# Patient Record
Sex: MALE | Race: WHITE | URBAN - METROPOLITAN AREA
[De-identification: names, ages, dates, MRNs, and addresses within clinical notes are randomized per-mention and may not be internally consistent; named-entity substitution may affect disease eponyms.]

---

## 2021-08-06 ENCOUNTER — OFFICE VISIT (OUTPATIENT)
Dept: URGENT CARE | Age: 36
End: 2021-08-06
Payer: COMMERCIAL

## 2021-08-06 VITALS
RESPIRATION RATE: 15 BRPM | BODY MASS INDEX: 36.65 KG/M2 | SYSTOLIC BLOOD PRESSURE: 150 MMHG | TEMPERATURE: 98.7 F | OXYGEN SATURATION: 97 % | HEIGHT: 71 IN | WEIGHT: 261.8 LBS | HEART RATE: 80 BPM | DIASTOLIC BLOOD PRESSURE: 91 MMHG

## 2021-08-06 DIAGNOSIS — M54.9 SUBACUTE BACK PAIN: Primary | ICD-10-CM

## 2021-08-06 PROCEDURE — 99203 OFFICE O/P NEW LOW 30 MIN: CPT | Performed by: FAMILY MEDICINE

## 2021-08-06 RX ORDER — PREDNISONE 10 MG/1
TABLET ORAL
Qty: 21 TABLET | Refills: 0 | Status: SHIPPED | OUTPATIENT
Start: 2021-08-06 | End: 2021-11-12 | Stop reason: ALTCHOICE

## 2021-08-06 NOTE — LETTER
August 6, 2021  [address] [place] [id number]    Dear Mendel Treadwell:  Thank you for requesting access to [facility]. Please follow the instructions below to securely access and download your online medical record. [facility] allows you to send messages to your doctor, view your test results, renew your prescriptions, schedule appointments, and more. How Do I Sign Up? 1. In your internet browser, go to [email or web address]. 2. Click on the First Time User? Click Here link in the Sign In box. You will see the New Member Sign Up page. 3. Enter your [facility] Access Code exactly as it appears below. You will not need to use this code after youve completed the sign-up process. If you do not sign up before the expiration date, you must request a new code. [facility] Access Code: Activation code not generated  Current [facility] Status: Active     4. Enter the last four digits of your Social Security Number (xxxx) and Date of Birth (mm/dd/yyyy) as indicated and click Submit. You will be taken to the next sign-up page. 5. Create a [facility] ID. This will be your [facility] login ID and cannot be changed, so think of one that is secure and easy to remember. 6. Create a [facility] password. You can change your password at any time. 7. Enter your Password Reset Question and Answer. This can be used at a later time if you forget your password. 8. Enter your e-mail address. You will receive e-mail notification when new information is available in [address]. 9. Click Sign Up. You can now view and download portions of your medical record. 10. Click the Download Summary menu link to download a portable copy of your medical information. Additional Information    If you have questions, please visit the Frequently Asked Questions section of the [employer] website at [email or web address] Remember, [facility] is NOT to be used for urgent needs.  For medical emergencies, dial 911.    Now available from your iPhone and Android!     Sincerely,   [employer]

## 2021-08-06 NOTE — PROGRESS NOTES
Here for low back pain  Onset approx 6-8 week ago without acute injury. Pain 4/10 intermittent. Location; midline to right low back. Worse with movement after rest.  Improved with rest.  Has performed 6 weeks of stretching, working out and has intentionally lost weight as he attributed weight gain to be a potential cause of his back pain. Overall not improving    Specifically denies: fever, chills, night sweats, IVDU, pelvic numbness, loss of bowel or bladder control, lower extremity weakness/numbness/tingling             History reviewed. No pertinent past medical history. History reviewed. No pertinent surgical history. History reviewed. No pertinent family history. Social History     Socioeconomic History    Marital status:      Spouse name: Not on file    Number of children: Not on file    Years of education: Not on file    Highest education level: Not on file   Occupational History    Not on file   Tobacco Use    Smoking status: Never Smoker    Smokeless tobacco: Never Used   Substance and Sexual Activity    Alcohol use: Not Currently    Drug use: Not on file    Sexual activity: Not on file   Other Topics Concern    Not on file   Social History Narrative    Not on file     Social Determinants of Health     Financial Resource Strain:     Difficulty of Paying Living Expenses:    Food Insecurity:     Worried About Running Out of Food in the Last Year:     [address] in the Last Year:    Transportation Needs:     Lack of Transportation (Medical):      Lack of Transportation (Non-Medical):    Physical Activity:     Days of Exercise per Week:     Minutes of Exercise per Session:    Stress:     Feeling of Stress :    Social Connections:     Frequency of Communication with Friends and Family:     Frequency of Social Gatherings with Friends and Family:     Attends Jehovah's witness Services:     Active Member of Clubs or Organizations:     Attends Club or Organization Meetings:     Marital Status:    Intimate Partner Violence:     Fear of Current or Ex-Partner:     Emotionally Abused:     Physically Abused:     Sexually Abused: ALLERGIES: Patient has no known allergies. Review of Systems   All other systems reviewed and are negative. Vitals:    08/06/21 1350   BP: (!) 150/91   Pulse: 80   Resp: 15   Temp: 98.7 °F (37.1 °C)   SpO2: 97%   Weight: 261 lb 12.8 oz (118.8 kg)   Height: 5' 11\" (1.803 m)       Physical Exam  Vitals reviewed. Constitutional:       General: He is not in acute distress. Appearance: He is not diaphoretic. HENT:      Head: Normocephalic and atraumatic. Eyes:      Extraocular Movements: Extraocular movements intact. Cardiovascular:      Rate and Rhythm: Normal rate and regular rhythm. Pulses: Normal pulses. Musculoskeletal:        Back:       Comments:   Cap refill upper and lower extremities < 2 sec with normal neurovascular sensation to light touch bilaterally. Heel and toe walk: Intact  Plantar and dorsiflexion ankle full 5/5 strength  Torso rotation: full AROM without pain  Toe touch: full AROM minimal pain  SLR: negative bilat  Nic:negative bilat   Skin:     Capillary Refill: Capillary refill takes less than 2 seconds. Neurological:      Mental Status: He is alert and oriented to person, place, and time. Psychiatric:         Mood and Affect: Mood normal.         Behavior: Behavior normal.         Thought Content:  Thought content normal.         MDM     Differential Diagnosis; Clinical Impression; Plan:       CLINICAL IMPRESSION:  (M54.9) Subacute back pain  (primary encounter diagnosis)    Orders Placed This Encounter      predniSONE (STERAPRED DS) 10 mg dose pack          Sig: Take per dose pack instructions          Dispense:  21 Tablet          Refill:  0      Plan:  Prednisone for symptomatic relief  advised formal PT evaluation/treatment  To follow up with PCP or ortho if not improved in 4 weeks  Immediate follow up for new, worsening or changes  No abnormalities noted on plain film per radiology read      We have reviewed concerning signs/symptoms, normal vs abnormal progression of medical condition and when to seek immediate medical attention. Procedures      XR Results (most recent):  Results from Appointment encounter on 08/06/21    XR SPINE LUMB 2 OR 3 V    Narrative  EXAM:  XR SPINE LUMB 2 OR 3 V    INDICATION:   low back pain for 4-6 weeks. no acute injury. midline to left  lunbar with intermittent left sciatica    COMPARISON: None. FINDINGS: AP, lateral and spot lateral views of the lumbar spine demonstrate  normal alignment. The vertebral body heights and disc spaces are  well-preserved. There is no fracture, subluxation or other abnormality. Impression  No acute abnormality is identified.

## 2021-11-12 ENCOUNTER — OFFICE VISIT (OUTPATIENT)
Dept: INTERNAL MEDICINE CLINIC | Age: 36
End: 2021-11-12
Payer: COMMERCIAL

## 2021-11-12 VITALS
HEIGHT: 71 IN | RESPIRATION RATE: 16 BRPM | WEIGHT: 251 LBS | BODY MASS INDEX: 35.14 KG/M2 | TEMPERATURE: 97.2 F | HEART RATE: 73 BPM | OXYGEN SATURATION: 98 % | DIASTOLIC BLOOD PRESSURE: 76 MMHG | SYSTOLIC BLOOD PRESSURE: 114 MMHG

## 2021-11-12 DIAGNOSIS — M54.50 CHRONIC MIDLINE LOW BACK PAIN WITHOUT SCIATICA: ICD-10-CM

## 2021-11-12 DIAGNOSIS — Z13.220 SCREENING CHOLESTEROL LEVEL: ICD-10-CM

## 2021-11-12 DIAGNOSIS — Z13.1 SCREENING FOR DIABETES MELLITUS: ICD-10-CM

## 2021-11-12 DIAGNOSIS — G89.29 CHRONIC MIDLINE LOW BACK PAIN WITHOUT SCIATICA: ICD-10-CM

## 2021-11-12 DIAGNOSIS — L08.9 SKIN INFECTION: ICD-10-CM

## 2021-11-12 DIAGNOSIS — Z00.00 ENCOUNTER FOR MEDICAL EXAMINATION TO ESTABLISH CARE: ICD-10-CM

## 2021-11-12 DIAGNOSIS — Z23 NEEDS FLU SHOT: Primary | ICD-10-CM

## 2021-11-12 DIAGNOSIS — F33.1 MODERATE EPISODE OF RECURRENT MAJOR DEPRESSIVE DISORDER (HCC): ICD-10-CM

## 2021-11-12 DIAGNOSIS — S06.9X1D TRAUMATIC BRAIN INJURY, WITH LOSS OF CONSCIOUSNESS OF 30 MINUTES OR LESS, SUBSEQUENT ENCOUNTER: ICD-10-CM

## 2021-11-12 LAB
ALBUMIN SERPL-MCNC: 4.3 G/DL (ref 3.5–5)
ALBUMIN/GLOB SERPL: 1.4 {RATIO} (ref 1.1–2.2)
ALP SERPL-CCNC: 68 U/L (ref 45–117)
ALT SERPL-CCNC: 52 U/L (ref 12–78)
ANION GAP SERPL CALC-SCNC: 7 MMOL/L (ref 5–15)
AST SERPL-CCNC: 19 U/L (ref 15–37)
BASOPHILS # BLD: 0.1 K/UL (ref 0–0.1)
BASOPHILS NFR BLD: 1 % (ref 0–1)
BILIRUB SERPL-MCNC: 0.4 MG/DL (ref 0.2–1)
BUN SERPL-MCNC: 15 MG/DL (ref 6–20)
BUN/CREAT SERPL: 16 (ref 12–20)
CALCIUM SERPL-MCNC: 9.7 MG/DL (ref 8.5–10.1)
CHLORIDE SERPL-SCNC: 105 MMOL/L (ref 97–108)
CHOLEST SERPL-MCNC: 216 MG/DL
CO2 SERPL-SCNC: 27 MMOL/L (ref 21–32)
CREAT SERPL-MCNC: 0.91 MG/DL (ref 0.7–1.3)
DIFFERENTIAL METHOD BLD: NORMAL
EOSINOPHIL # BLD: 0.4 K/UL (ref 0–0.4)
EOSINOPHIL NFR BLD: 4 % (ref 0–7)
ERYTHROCYTE [DISTWIDTH] IN BLOOD BY AUTOMATED COUNT: 12.8 % (ref 11.5–14.5)
EST. AVERAGE GLUCOSE BLD GHB EST-MCNC: 111 MG/DL
GLOBULIN SER CALC-MCNC: 3.1 G/DL (ref 2–4)
GLUCOSE SERPL-MCNC: 86 MG/DL (ref 65–100)
HBA1C MFR BLD: 5.5 % (ref 4–5.6)
HCT VFR BLD AUTO: 46.3 % (ref 36.6–50.3)
HDLC SERPL-MCNC: 58 MG/DL
HDLC SERPL: 3.7 {RATIO} (ref 0–5)
HGB BLD-MCNC: 14.9 G/DL (ref 12.1–17)
IMM GRANULOCYTES # BLD AUTO: 0 K/UL (ref 0–0.04)
IMM GRANULOCYTES NFR BLD AUTO: 0 % (ref 0–0.5)
LDLC SERPL CALC-MCNC: 124.2 MG/DL (ref 0–100)
LYMPHOCYTES # BLD: 3.1 K/UL (ref 0.8–3.5)
LYMPHOCYTES NFR BLD: 30 % (ref 12–49)
MCH RBC QN AUTO: 29.6 PG (ref 26–34)
MCHC RBC AUTO-ENTMCNC: 32.2 G/DL (ref 30–36.5)
MCV RBC AUTO: 91.9 FL (ref 80–99)
MONOCYTES # BLD: 0.9 K/UL (ref 0–1)
MONOCYTES NFR BLD: 9 % (ref 5–13)
NEUTS SEG # BLD: 5.7 K/UL (ref 1.8–8)
NEUTS SEG NFR BLD: 56 % (ref 32–75)
NRBC # BLD: 0 K/UL (ref 0–0.01)
NRBC BLD-RTO: 0 PER 100 WBC
PLATELET # BLD AUTO: 303 K/UL (ref 150–400)
PMV BLD AUTO: 11.1 FL (ref 8.9–12.9)
POTASSIUM SERPL-SCNC: 4.4 MMOL/L (ref 3.5–5.1)
PROT SERPL-MCNC: 7.4 G/DL (ref 6.4–8.2)
RBC # BLD AUTO: 5.04 M/UL (ref 4.1–5.7)
SODIUM SERPL-SCNC: 139 MMOL/L (ref 136–145)
TRIGL SERPL-MCNC: 169 MG/DL (ref ?–150)
TSH SERPL DL<=0.05 MIU/L-ACNC: 1.81 UIU/ML (ref 0.36–3.74)
VLDLC SERPL CALC-MCNC: 33.8 MG/DL
WBC # BLD AUTO: 10.1 K/UL (ref 4.1–11.1)

## 2021-11-12 PROCEDURE — 99204 OFFICE O/P NEW MOD 45 MIN: CPT | Performed by: INTERNAL MEDICINE

## 2021-11-12 PROCEDURE — 90471 IMMUNIZATION ADMIN: CPT | Performed by: INTERNAL MEDICINE

## 2021-11-12 PROCEDURE — 90686 IIV4 VACC NO PRSV 0.5 ML IM: CPT | Performed by: INTERNAL MEDICINE

## 2021-11-12 RX ORDER — NAPROXEN 500 MG/1
500 TABLET ORAL 2 TIMES DAILY WITH MEALS
Qty: 20 TABLET | Refills: 0 | Status: SHIPPED | OUTPATIENT
Start: 2021-11-12 | End: 2021-12-13 | Stop reason: ALTCHOICE

## 2021-11-12 RX ORDER — CYCLOBENZAPRINE HCL 5 MG
5 TABLET ORAL
Qty: 20 TABLET | Refills: 1 | Status: SHIPPED | OUTPATIENT
Start: 2021-11-12 | End: 2021-12-13 | Stop reason: ALTCHOICE

## 2021-11-12 RX ORDER — BUPROPION HYDROCHLORIDE 150 MG/1
150 TABLET ORAL
Qty: 30 TABLET | Refills: 5 | Status: SHIPPED | OUTPATIENT
Start: 2021-11-12 | End: 2021-12-01 | Stop reason: SINTOL

## 2021-11-12 RX ORDER — CEPHALEXIN 500 MG/1
500 CAPSULE ORAL 4 TIMES DAILY
Qty: 40 CAPSULE | Refills: 0 | Status: SHIPPED | OUTPATIENT
Start: 2021-11-12 | End: 2021-12-13 | Stop reason: ALTCHOICE

## 2021-11-12 NOTE — PROGRESS NOTES
Mr. Amy Johnson is a new patient who is here to establish care. CC:  Establish Care (Pain under left armpit), Back Pain (Went to 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 Some days cannot recall his own address  No aggression  Goes from normal to sadness/ endorses anhedonia  No current thoughts of hurting self  wife notes pt is depressed  No seizures  Sleeps well          Pain under left armpit - felt lump 6 days ago, keeping it clean putting hot towel. Has swollen  More painful   Warm red  No fever   No malaise  No pustular drainage    Patient has hx of back pain for 4-6 weeks  On 08/2021 seen at urgent care   Had normal x ray   Prednisone helped for a few weeks  Occurs if sitting for more than 35 minutes  Standing or laying down no pain  Pain intense when sitting and radiates across the buttock and hip    Works from home - job is great   and has dog   No ETOH  No drugs  Non smoker    Review of systems:  Constitutional: negative for fever, chills, weight loss, night sweats   10 systems reviewed and negative other then HPI     Past Medical History:   Diagnosis Date    Concussion         Past Surgical History:   Procedure Laterality Date    HX CYST REMOVAL      On back 2016    HX SHOULDER ARTHROSCOPY Left 2008       No Known Allergies    No current outpatient medications on file prior to visit. No current facility-administered medications on file prior to visit.        family history includes Alcohol abuse in his father; Cancer (age of onset: 35) in his brother; Diabetes in his mother. Social History     Socioeconomic History    Marital status:      Spouse name: Not on file    Number of children: Not on file    Years of education: Not on file    Highest education level: Not on file   Occupational History    Not on file   Tobacco Use    Smoking status: Never Smoker    Smokeless tobacco: Never Used   Vaping Use    Vaping Use: Not on file   Substance and Sexual Activity    Alcohol use: Not Currently    Drug use: Not on file    Sexual activity: Yes     Partners: Female   Other Topics Concern    Not on file   Social History Narrative    Not on file     Social Determinants of Health     Financial Resource Strain:     Difficulty of Paying Living Expenses: Not on file   Food Insecurity: No Food Insecurity    Worried About Running Out of Food in the Last Year: Never true    Farrah of Food in the Last Year: Never true   Transportation Needs:     Lack of Transportation (Medical): Not on file    Lack of Transportation (Non-Medical):  Not on file   Physical Activity:     Days of Exercise per Week: Not on file    Minutes of Exercise per Session: Not on file   Stress:     Feeling of Stress : Not on file   Social Connections:     Frequency of Communication with Friends and Family: Not on file    Frequency of Social Gatherings with Friends and Family: Not on file    Attends Rastafari Services: Not on file    Active Member of [address] [place] [address] [employer] or Organizations: Not on file    Attends Club or Organization Meetings: Not on file    Marital Status: Not on file   Intimate Partner Violence:     Fear of Current or Ex-Partner: Not on file    Emotionally Abused: Not on file    Physically Abused: Not on file    Sexually Abused: Not on file   Housing Stability:     Unable to Pay for Housing in the Last Year: Not on file    Number of [place] in the Last Year: Not on file    Unstable Housing in the Last Year: Not on file       Visit Vitals  /76 (BP 1 Location: Left arm, BP Patient Position: Sitting)   Pulse 73   Temp 97.2 °F (36.2 °C) (Temporal)   Resp 16   Ht 5' 11\" (1.803 m)   Wt 251 lb (113.9 kg)   SpO2 98%   BMI 35.01 kg/m²     General:  Well appearing male no acute distress  HEENT:   PERRL,normal conjunctiva. External ear and canals normal, TMs normal.  Hearing normal to voice. Nose without edema or discharge, normal septum. Lips, teeth, gums normal.  Oropharynx: no erythema, no exudates, no lesions, normal tongue. Neck:  Supple. Thyroid normal size, nontender, without nodules. No carotid bruit. No masses or lymphadenopathy  Respiratory: no respiratory distress,  no wheezing, no rhonchi, no rales. No chest wall tenderness. Cardiovascular:  RRR, normal S1S2, no murmur. Gastrointestinal: normal bowel sounds, soft, nontender, without masses. No hepatosplenomegaly. Extremities +2 pulses, no edema, normal sensation   Musculoskeletal:  Normal gait. Normal digits and nails. Normal strength and tone, no atrophy, and no abnormal movement. Tenderness in lower back to palpation  Skin: left armpit with redness, with swelling, warmth, are with induration 2 cm round no pus  Neuro:  A and OX4, fluent speech, cranial nerves normal 2-12. Sensation normal to light touch. DTR symmetrical  Psych:  Normal affect                Assessment and Plan:     1. Needs flu shot  - INFLUENZA VIRUS VAC QUAD,SPLIT,PRESV FREE SYRINGE IM    2. Skin infection  Warm compresses   - cephALEXin (KEFLEX) 500 mg capsule; Take 1 Capsule by mouth four (4) times daily. Dispense: 40 Capsule; Refill: 0    3. Chronic midline low back pain without sciatica  X ray of back is normal  Given exercises   - cyclobenzaprine (FLEXERIL) 5 mg tablet; Take 1 Tablet by mouth three (3) times daily as needed for Muscle Spasm(s). Dispense: 20 Tablet; Refill: 1  - naproxen (NAPROSYN) 500 mg tablet; Take 1 Tablet by mouth two (2) times daily (with meals). Dispense: 20 Tablet; Refill: 0    4.  Encounter for medical examination to establish care      5. Moderate episode of recurrent major depressive disorder ([facility] [address]  Lots of traumatic experiences has seen therapist for a few years ( in [employer])   Discussed chemical imbalance in depression  - start with wellbutrin   - METABOLIC PANEL, COMPREHENSIVE; Future  - buPROPion XL (WELLBUTRIN XL) 150 mg tablet; Take 1 Tablet by mouth every morning. Dispense: 30 Tablet; Refill: 5  - TSH 3RD GENERATION; Future  - CBC WITH AUTOMATED DIFF; Future    6. Screening cholesterol level  - LIPID PANEL; Future    7. Screening for diabetes mellitus  - HEMOGLOBIN A1C WITH EAG; Future    8.  Traumatic brain injury, with loss of consciousness of 30 minutes or less, subsequent encounter  Hx of traumatic brain injury has nad MRI and EEGs in the past        Follow up virtual visit in 4 weeks    Ronaldo Jett MD

## 2021-11-12 NOTE — PATIENT INSTRUCTIONS
Low Back Arthritis: Exercises  Introduction  Here are some examples of typical rehabilitation exercises for your condition. Start each exercise slowly. Ease off the exercise if you start to have pain. Your doctor or physical therapist will tell you when you can start these exercises and which ones will work best for you. When you are not being active, find a comfortable position for rest. Some people are comfortable on the floor or a medium-firm bed with a small pillow under their head and another under their knees. Some people prefer to lie on their side with a pillow between their knees. Don't stay in one position for too long. Take short walks (10 to 20 minutes) every 2 to 3 hours. Avoid slopes, hills, and stairs until you feel better. Walk only distances you can manage without pain, especially leg pain. How to do the exercises  Pelvic tilt    1. Lie on your back with your knees bent. 2. \"Brace\" your stomachtighten your muscles by pulling in and imagining your belly button moving toward your spine. 3. Press your lower back into the floor. You should feel your hips and pelvis rock back. 4. Hold for 6 seconds while breathing smoothly. 5. Relax and allow your pelvis and hips to rock forward. 6. Repeat 8 to 12 times. Back stretches    1. Get down on your hands and knees on the floor. 2. Relax your head and allow it to droop. Round your back up toward the ceiling until you feel a nice stretch in your upper, middle, and lower back. Hold this stretch for as long as it feels comfortable, or about 15 to 30 seconds. 3. Return to the starting position with a flat back while you are on your hands and knees. 4. Let your back sway by pressing your stomach toward the floor. Lift your buttocks toward the ceiling. 5. Hold this position for 15 to 30 seconds. 6. Repeat 2 to 4 times. Follow-up care is a key part of your treatment and safety.  Be sure to make and go to all appointments, and call your doctor if you are having problems. It's also a good idea to know your test results and keep a list of the medicines you take. Where can you learn more? Go to http://www.[employer].com/  Enter T094 in the search box to learn more about \"Low Back Arthritis: Exercises. \"  Current as of: July 1, 2021               Content Version: 13.0  © 9111-9355 [employer]. Care instructions adapted under license by [employer] (which disclaims liability or warranty for this information). If you have questions about a medical condition or this instruction, always ask your healthcare professional. [place] [address] any warranty or liability for your use of this information.

## 2021-11-14 ENCOUNTER — PATIENT MESSAGE (OUTPATIENT)
Dept: INTERNAL MEDICINE CLINIC | Age: 36
End: 2021-11-14

## 2021-11-30 NOTE — PROGRESS NOTES
No diabetes   Cholesterol: Triglycerides are high ( short term fat storage), HDL good cholesterol is at goal,  LDL which is bad cholesterol is mildly elevated. Recommend increasing  exercise to 30 minutes daily and increased fiber intake - vegetables, fruits and oats and whole grain. Decreasing fatty food intake. We will repeat cholesterol level in one year.     Normal blood count  Normal kidney function   Normal thyroid    Message sent via my chart

## 2021-12-01 RX ORDER — FLUOXETINE 10 MG/1
10 TABLET ORAL DAILY
Qty: 30 TABLET | Refills: 2 | Status: SHIPPED | OUTPATIENT
Start: 2021-12-01 | End: 2021-12-13 | Stop reason: DRUGHIGH

## 2021-12-01 NOTE — TELEPHONE ENCOUNTER
Michael Stubbs 12/1/2021 8:08 AM EST      ----- Message -----  From: Cherrie Morales  Sent: 11/30/2021 8:18 PM EST  To: [facility] Nurse Pool  Subject: Infected sweat gland     Hey doc - so I have been on the 150mg buproprian for 3 weeks now. I feel like I have seen very little as far as positive changes, but negatively I have felt increasingly anxious and agitated, with higher resting heart rate and some fluttering in my chest. Also, I have very mild tinnitus which feels like it's gotten louder and more prevalent. Is this normal while the body adjusts or is this a concern?  Thanks

## 2021-12-13 ENCOUNTER — VIRTUAL VISIT (OUTPATIENT)
Dept: INTERNAL MEDICINE CLINIC | Age: 36
End: 2021-12-13
Payer: COMMERCIAL

## 2021-12-13 DIAGNOSIS — E78.00 HIGH CHOLESTEROL: ICD-10-CM

## 2021-12-13 DIAGNOSIS — F33.1 MODERATE EPISODE OF RECURRENT MAJOR DEPRESSIVE DISORDER (HCC): Primary | ICD-10-CM

## 2021-12-13 PROCEDURE — 99214 OFFICE O/P EST MOD 30 MIN: CPT | Performed by: INTERNAL MEDICINE

## 2021-12-13 RX ORDER — FLUOXETINE 20 MG/1
20 TABLET ORAL DAILY
Qty: 30 TABLET | Refills: 1 | Status: SHIPPED | OUTPATIENT
Start: 2021-12-13 | End: 2022-01-07 | Stop reason: SDUPTHER

## 2021-12-13 NOTE — PROGRESS NOTES
CC: Follow-up, Medication Evaluation, Depression, and Cyst (left armpit)      HPI:    He is a 43 y.o. male who presents for evaluation of follow up on depression anxiety    Previous visit started Wellbutrin ( avoiding weight gain) -  felt increasingly anxious and agitated    Started prozac 10 mg daily feels same as before  Recall patient has a hx of      Concussion hx  4 hospitalizations, played soccer [employer] team and also played rugby, had concussions in both   Had deployments to [employer] trauma in job as well  Saw neurologist in [place] and had MRI brain and EEG I dont have records  Experiences memory issues and mood changes. Some days cannot recall his own address  No aggression  Goes from normal to sadness/ endorses anhedonia  No current thoughts of hurting self  wife notes pt is depressed  No seizures  Sleeps well        Left armpit resolved      Back pain is better and not taking NSAIDs or flexeril     Works from home - job is great   and has dog   No ETOH  No drugs  Non smoker    This is an established visit conducted via telemedicine with video. The patient has been instructed that this meets HIPAA criteria and acknowledges and agrees to this method of visitation. Pursuant to the emergency declaration under the [facility], 1135 waiver authority and the [place] and [employer] Act, this Virtual Visit was conducted, with patient's consent, to reduce the patient's risk of exposure to COVID-19 and provide continuity of care for an established patient. Services were provided through a video synchronous discussion virtually to substitute for in-person clinic visit.        ROS:  Constitutional: negative for fevers, chills, anorexia and weight loss  10 systems reviewed and negative other then HPI     Past Medical History:   Diagnosis Date    Concussion     Depression        No current outpatient medications on file prior to visit. No current facility-administered medications on file prior to visit. Past Surgical History:   Procedure Laterality Date    HX CYST REMOVAL      On back 2016    HX SHOULDER ARTHROSCOPY Left 2008       Family History   Problem Relation Age of Onset    Diabetes Mother     Alcohol abuse Father     Cancer Brother 35        Skin Cancer     Reviewed and no changes     Social History     Socioeconomic History    Marital status:      Spouse name: Not on file    Number of children: Not on file    Years of education: Not on file    Highest education level: Not on file   Occupational History    Not on file   Tobacco Use    Smoking status: Never Smoker    Smokeless tobacco: Never Used   Vaping Use    Vaping Use: Not on file   Substance and Sexual Activity    Alcohol use: Not Currently    Drug use: Not on file    Sexual activity: Yes     Partners: Female   Other Topics Concern    Not on file   Social History Narrative    Not on file     Social Determinants of Health     Financial Resource Strain:     Difficulty of Paying Living Expenses: Not on file   Food Insecurity: No Food Insecurity    Worried About Running Out of Food in the Last Year: Never true    Farrah of Food in the Last Year: Never true   Transportation Needs:     Lack of Transportation (Medical): Not on file    Lack of Transportation (Non-Medical):  Not on file   Physical Activity:     Days of Exercise per Week: Not on file    Minutes of Exercise per Session: Not on file   Stress:     Feeling of Stress : Not on file   Social Connections:     Frequency of Communication with Friends and Family: Not on file    Frequency of Social Gatherings with Friends and Family: Not on file    Attends Anabaptism Services: Not on file    Active Member of Clubs or Organizations: Not on file    Attends Club or Organization Meetings: Not on file    Marital Status: Not on file   Intimate Partner Violence:  Fear of Current or Ex-Partner: Not on file    Emotionally Abused: Not on file    Physically Abused: Not on file    Sexually Abused: Not on file   Housing Stability:     Unable to Pay for Housing in the Last Year: Not on file    Number of Places Lived in the Last Year: Not on file    Unstable Housing in the Last Year: Not on file          There were no vitals taken for this visit. Physical Examination:   Gen: well appearing male  HEENT: normal conjunctiva, no audible congestion, patient does not see oral erythema, has MMM  Neck: patient does not feel enlarged or tender LAD or masses  Resp: normal respiratory effort, no audible wheezing. CV: patient does not feel palpitations or heart irregularity  Abd: patient does not feel abdominal tenderness or mass, patient does not notice distension  Extrem: patient does not see swelling in ankles or joints.    Neuro: Alert and oriented, able to answer questions without difficulty, able to move all extremities and walk normally          Lab Results   Component Value Date/Time    WBC 10.1 11/12/2021 10:50 AM    HGB 14.9 11/12/2021 10:50 AM    HCT 46.3 11/12/2021 10:50 AM    PLATELET 152 17/66/8377 10:50 AM    MCV 91.9 11/12/2021 10:50 AM     Lab Results   Component Value Date/Time    Sodium 139 11/12/2021 10:50 AM    Potassium 4.4 11/12/2021 10:50 AM    Chloride 105 11/12/2021 10:50 AM    CO2 27 11/12/2021 10:50 AM    Anion gap 7 11/12/2021 10:50 AM    Glucose 86 11/12/2021 10:50 AM    BUN 15 11/12/2021 10:50 AM    Creatinine 0.91 11/12/2021 10:50 AM    BUN/Creatinine ratio 16 11/12/2021 10:50 AM    GFR est AA >60 11/12/2021 10:50 AM    GFR est non-AA >60 11/12/2021 10:50 AM    Calcium 9.7 11/12/2021 10:50 AM     Lab Results   Component Value Date/Time    Cholesterol, total 216 (H) 11/12/2021 10:50 AM    HDL Cholesterol 58 11/12/2021 10:50 AM    LDL, calculated 124.2 (H) 11/12/2021 10:50 AM    VLDL, calculated 33.8 11/12/2021 10:50 AM    Triglyceride 169 (H) 11/12/2021 10:50 AM    CHOL/HDL Ratio 3.7 11/12/2021 10:50 AM     Lab Results   Component Value Date/Time    TSH 1.81 11/12/2021 10:50 AM     No results found for: PSA, Annabel Box, [id number], [id number]  Lab Results   Component Value Date/Time    Hemoglobin A1c 5.5 11/12/2021 10:50 AM     No results found for: Rashid Gonzalez, VD3RIA    Lab Results   Component Value Date/Time    ALT (SGPT) 52 11/12/2021 10:50 AM    Alk. phosphatase 68 11/12/2021 10:50 AM    Bilirubin, total 0.4 11/12/2021 10:50 AM           Assessment/Plan:    1. Moderate episode of recurrent major depressive disorder (HCC)  - had negative side effects with wellbutrin stopped  - tolerating prozac 10mg but no improvement in depression therefore increase dose to 20mg     2. High cholesterol  Lifestyle changes  Discussed healthy diet       Traumatic brain injury, with loss of consciousness of 30 minutes or less, subsequent encounter  Hx of traumatic brain injury has nad MRI and EEGs in the past     Follow-up and Dispositions    · Return in about 3 months (around 3/13/2022). Rita Ovalle MD    This is an established visit conducted via real time video and audio telemedicine. The patient has been instructed that this meets HIPAA criteria and acknowledges and agrees to this method of visitation.

## 2021-12-13 NOTE — PROGRESS NOTES
Alyssa Em (: 1985) is a 43 y.o. male, established patient, here for evaluation of the following chief complaint(s):   Follow-up, Medication Evaluation, Depression, and Cyst (left armpit)     Two pt identifiers confirmed. Alyssa Em, was evaluated through a synchronous (real-time) audio-video encounter. The patient (or guardian if applicable) is aware that this is a billable service. Verbal consent to proceed has been obtained within the past 12 months. The visit was conducted pursuant to the emergency declaration under the [address] [place] [address], [address] [place] [address] authority and the [employer] and [employer] Act. Patient identification was verified, and a caregiver was present when appropriate. The patient was located in a state where the provider was credentialed to provide care. An electronic signature was used to authenticate this note.   -- Ernst Peterson LPN

## 2022-01-07 RX ORDER — FLUOXETINE 20 MG/1
20 TABLET ORAL DAILY
Qty: 30 TABLET | Refills: 1 | Status: SHIPPED | OUTPATIENT
Start: 2022-01-07 | End: 2022-02-08

## 2022-01-07 NOTE — TELEPHONE ENCOUNTER
Future Appointments:  No future appointments. Last Appointment With Me:  12/13/2021     Requested Prescriptions     Pending Prescriptions Disp Refills    FLUoxetine (PROzac) 20 mg tablet 30 Tablet 1     Sig: Take 1 Tablet by mouth daily. The patient would like a 90 day supply.     Signed By: Rexie Leventhal, LPN     January 7, 6349

## 2022-02-08 RX ORDER — FLUOXETINE 20 MG/1
20 TABLET ORAL DAILY
Qty: 30 TABLET | Refills: 1 | OUTPATIENT
Start: 2022-02-08

## 2022-02-08 NOTE — TELEPHONE ENCOUNTER
Future Appointments:  No future appointments. Last Appointment With Me:  12/13/2021     Requested Prescriptions     Pending Prescriptions Disp Refills    FLUoxetine (PROzac) 20 mg tablet 30 Tablet 1     Sig: Take 1 Tablet by mouth daily.

## 2022-03-03 NOTE — TELEPHONE ENCOUNTER
Hey Doctor Gabi Smith - hope you're well! Could we contemplate an increased dosage on the Prozac? Current world affairs are having a significant detrimental affect on my mood/anxiety and would be interested to see if you think it would be a good idea to try an increased dosage. Many thanks, stay well.

## 2022-03-05 RX ORDER — FLUOXETINE HYDROCHLORIDE 40 MG/1
40 CAPSULE ORAL DAILY
Qty: 30 CAPSULE | Refills: 1 | Status: SHIPPED | OUTPATIENT
Start: 2022-03-05 | End: 2022-03-17 | Stop reason: SDUPTHER

## 2022-03-17 ENCOUNTER — VIRTUAL VISIT (OUTPATIENT)
Dept: INTERNAL MEDICINE CLINIC | Age: 37
End: 2022-03-17
Payer: COMMERCIAL

## 2022-03-17 DIAGNOSIS — S06.9X1D TRAUMATIC BRAIN INJURY, WITH LOSS OF CONSCIOUSNESS OF 30 MINUTES OR LESS, SUBSEQUENT ENCOUNTER: ICD-10-CM

## 2022-03-17 DIAGNOSIS — F33.1 MODERATE EPISODE OF RECURRENT MAJOR DEPRESSIVE DISORDER (HCC): Primary | ICD-10-CM

## 2022-03-17 PROCEDURE — 99213 OFFICE O/P EST LOW 20 MIN: CPT | Performed by: INTERNAL MEDICINE

## 2022-03-17 RX ORDER — FLUOXETINE HYDROCHLORIDE 40 MG/1
40 CAPSULE ORAL DAILY
Qty: 90 CAPSULE | Refills: 1 | Status: SHIPPED | OUTPATIENT
Start: 2022-04-01 | End: 2022-09-30

## 2022-03-17 NOTE — PROGRESS NOTES
CC: Follow-up and Depression      HPI:    He is a 43 y.o. male who presents for evaluation of follow up on depression anxiety    Previous visit started Wellbutrin ( avoiding weight gain) -  felt increasingly anxious and agitated    Started prozac and titrated up  currently at 40mg and feels that things are going progressively better. Did therapy a long time ago. Recommended therapy. Recall patient has a hx of      Concussion hx  4 hospitalizations, played soccer [employer] team and also played rugby, had concussions in both   Had deployments to [employer] trauma in job as well  Saw neurologist in [facility] and had MRI brain and EEG I dont have records  Experiences memory issues and mood changes. No aggression  Goes from normal to sadness/ endorses anhedonia  No current thoughts of hurting self  wife notes pt is depressed  No seizures  Sleeps well        Obesity lost 10 -12 lbs with healthy diet         Back pain is better and not taking NSAIDs or flexeril   Better with weight loss    Works from home - job is great   and has dog   No ETOH  No drugs  Non smoker    This is an established visit conducted via telemedicine with video. The patient has been instructed that this meets HIPAA criteria and acknowledges and agrees to this method of visitation. Pursuant to the emergency declaration under the [facility], 1135 waiver authority and the [employer] and [employer] Act, this Virtual Visit was conducted, with patient's consent, to reduce the patient's risk of exposure to COVID-19 and provide continuity of care for an established patient. Services were provided through a video synchronous discussion virtually to substitute for in-person clinic visit.        ROS:  Constitutional: negative for fevers, chills, anorexia and weight loss  10 systems reviewed and negative other then HPI     Past Medical History: Diagnosis Date    Concussion     Depression        Current Outpatient Medications on File Prior to Visit   Medication Sig Dispense Refill    FLUoxetine (PROzac) 40 mg capsule Take 1 Capsule by mouth daily. 30 Capsule 1     No current facility-administered medications on file prior to visit. Past Surgical History:   Procedure Laterality Date    HX CYST REMOVAL      On back 2016    HX SHOULDER ARTHROSCOPY Left 2008       Family History   Problem Relation Age of Onset    Diabetes Mother     Alcohol abuse Father     Cancer Brother 35        Skin Cancer     Reviewed and no changes     Social History     Socioeconomic History    Marital status:      Spouse name: Not on file    Number of children: Not on file    Years of education: Not on file    Highest education level: Not on file   Occupational History    Not on file   Tobacco Use    Smoking status: Never Smoker    Smokeless tobacco: Never Used   Vaping Use    Vaping Use: Not on file   Substance and Sexual Activity    Alcohol use: Not Currently    Drug use: Not on file    Sexual activity: Yes     Partners: Female   Other Topics Concern    Not on file   Social History Narrative    Not on file     Social Determinants of Health     Financial Resource Strain:     Difficulty of Paying Living Expenses: Not on file   Food Insecurity: No Food Insecurity    Worried About Running Out of Food in the Last Year: Never true    Farrah of Food in the Last Year: Never true   Transportation Needs:     Lack of Transportation (Medical): Not on file    Lack of Transportation (Non-Medical):  Not on file   Physical Activity:     Days of Exercise per Week: Not on file    Minutes of Exercise per Session: Not on file   Stress:     Feeling of Stress : Not on file   Social Connections:     Frequency of Communication with Friends and Family: Not on file    Frequency of Social Gatherings with Friends and Family: Not on file    Attends [employer] Services: Not on file    Active Member of Clubs or Organizations: Not on file    Attends Club or Organization Meetings: Not on file    Marital Status: Not on file   Intimate Partner Violence:     Fear of Current or Ex-Partner: Not on file    Emotionally Abused: Not on file    Physically Abused: Not on file    Sexually Abused: Not on file   Housing Stability:     Unable to Pay for Housing in the Last Year: Not on file    Number of [place] in the Last Year: Not on file    Unstable Housing in the Last Year: Not on file          There were no vitals taken for this visit. Physical Examination:   Gen: well appearing male  HEENT: normal conjunctiva, no audible congestion, patient does not see oral erythema, has MMM  Neck: patient does not feel enlarged or tender LAD or masses  Resp: normal respiratory effort, no audible wheezing. CV: patient does not feel palpitations or heart irregularity  Abd: patient does not feel abdominal tenderness or mass, patient does not notice distension  Extrem: patient does not see swelling in ankles or joints.    Neuro: Alert and oriented, able to answer questions without difficulty, able to move all extremities and walk normally          Lab Results   Component Value Date/Time    WBC 10.1 11/12/2021 10:50 AM    HGB 14.9 11/12/2021 10:50 AM    HCT 46.3 11/12/2021 10:50 AM    PLATELET 908 04/06/0414 10:50 AM    MCV 91.9 11/12/2021 10:50 AM     Lab Results   Component Value Date/Time    Sodium 139 11/12/2021 10:50 AM    Potassium 4.4 11/12/2021 10:50 AM    Chloride 105 11/12/2021 10:50 AM    CO2 27 11/12/2021 10:50 AM    Anion gap 7 11/12/2021 10:50 AM    Glucose 86 11/12/2021 10:50 AM    BUN 15 11/12/2021 10:50 AM    Creatinine 0.91 11/12/2021 10:50 AM    BUN/Creatinine ratio 16 11/12/2021 10:50 AM    GFR est AA >60 11/12/2021 10:50 AM    GFR est non-AA >60 11/12/2021 10:50 AM    Calcium 9.7 11/12/2021 10:50 AM     Lab Results   Component Value Date/Time    Cholesterol, total 216 (H) 11/12/2021 10:50 AM    HDL Cholesterol 58 11/12/2021 10:50 AM    LDL, calculated 124.2 (H) 11/12/2021 10:50 AM    VLDL, calculated 33.8 11/12/2021 10:50 AM    Triglyceride 169 (H) 11/12/2021 10:50 AM    CHOL/HDL Ratio 3.7 11/12/2021 10:50 AM     Lab Results   Component Value Date/Time    TSH 1.81 11/12/2021 10:50 AM     No results found for: PSA, Chasity Abbasi, [id number], [id number]  Lab Results   Component Value Date/Time    Hemoglobin A1c 5.5 11/12/2021 10:50 AM     No results found for: Halie Mera VD3RIA    Lab Results   Component Value Date/Time    ALT (SGPT) 52 11/12/2021 10:50 AM    Alk. phosphatase 68 11/12/2021 10:50 AM    Bilirubin, total 0.4 11/12/2021 10:50 AM           Assessment/Plan:    1. Moderate episode of recurrent major depressive disorder ([address]  - had negative side effects with wellbutrin stopped  - making progress on prozac 40mg daily continue current therapy    2. High cholesterol  Lifestyle changes  Discussed healthy diet   Lost 10 + lbs since last visit with healthy diet and exercise    Traumatic brain injury, with loss of consciousness of 30 minutes or less, subsequent encounter  Hx of traumatic brain injury has nad MRI and EEGs in the past       F/U in 6 months  Andreas Adams MD    This is an established visit conducted via real time video and audio telemedicine. The patient has been instructed that this meets HIPAA criteria and acknowledges and agrees to this method of visitation.

## 2022-03-17 NOTE — PROGRESS NOTES
1. \"Have you been to the ER, urgent care clinic since your last visit? Hospitalized since your last visit? \" No    2. \"Have you seen or consulted any other health care providers outside of the [address] [place] [address] since your last visit? \" No     3. For patients aged 39-70: Has the patient had a colonoscopy / FIT/ Cologuard? NA - based on age      If the patient is female:    4. For patients aged 41-77: Has the patient had a mammogram within the past 2 years? NA - based on age or sex      11. For patients aged 21-65: Has the patient had a pap smear?  NA - based on age or sex

## 2022-09-30 DIAGNOSIS — F33.1 MODERATE EPISODE OF RECURRENT MAJOR DEPRESSIVE DISORDER (HCC): ICD-10-CM

## 2022-09-30 RX ORDER — FLUOXETINE HYDROCHLORIDE 40 MG/1
CAPSULE ORAL
Qty: 90 CAPSULE | Refills: 1 | Status: SHIPPED | OUTPATIENT
Start: 2022-09-30

## 2022-12-02 ENCOUNTER — OFFICE VISIT (OUTPATIENT)
Dept: INTERNAL MEDICINE CLINIC | Age: 37
End: 2022-12-02
Payer: COMMERCIAL

## 2022-12-02 VITALS
WEIGHT: 257 LBS | OXYGEN SATURATION: 97 % | BODY MASS INDEX: 34.81 KG/M2 | SYSTOLIC BLOOD PRESSURE: 129 MMHG | DIASTOLIC BLOOD PRESSURE: 83 MMHG | TEMPERATURE: 97.4 F | HEIGHT: 72 IN | HEART RATE: 72 BPM | RESPIRATION RATE: 16 BRPM

## 2022-12-02 DIAGNOSIS — L98.9 SKIN LESION: Primary | ICD-10-CM

## 2022-12-02 NOTE — PATIENT INSTRUCTIONS
Follow-up with dermatology scheduled for 1/18 at 11:20 AM with Dr. Sri Woodard.    [address] 10-15 ahead of time with ID and insurance card

## 2022-12-02 NOTE — PROGRESS NOTES
Mr. Vishal Stover is presenting to follow up     CC:  Skin Problem (Mole changes and skin concerns all over his body.)       HPI:    Chau Braga is a pleasant 44 yo male with history of controlled depression on Prozac in for chief complaint of skin moles that are changing with time. He has a strong family history of skin cancer. He has several polyp like growths on the face and scalp, history of cysts on upper back with 1 active cyst at upper right of back, 3 skin tags on the lower back, and 1 lesion at upper left scalp, another lesion at mid-back, and 1 lesion at right tricep area with asymmetrical borders, intermittent bleeding without pus, and diffuse- poorly demarcated borders. 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 Aside from skin lesions, doing well with mental health and energy levels. On Prozac 40mg dose for past 1 year and reports positive outlook, good energy levels \"best I've ever felt,\" and no episodes of low mood. Family history of skin cancer in recent 2 years:   28 yo brother diagnosed with squamous cell carcinoma at scalp. Father and paternal grandmother diagnosed with skin cancer back in [facility] despite low sun exposure.        Review of systems:  Constitutional: negative for fever, chills, weight loss, night sweats   Eyes : negative for vision changes, eye pain and discharge  Nose and Throat: negative for tinnitus, sore throat   Cardiovascular: negative for chest pain, palpitations and shortness of breath  Respiratory: negative for shortness of breath, cough and wheezing   Gastroinstestinal: negative for abdominal pain, nausea, vomiting, diarrhea, constipation, and blood in the stool  Musculoskeletal: negative for back ache and joint ache   Genitourinary: negative for dysuria, nocturia, polyuria and hematuria   Neurologic: Negative for focal weakness, numbness or incoordination  Skin: see HPI  Hematologic: negative for easy bruising      Past Medical History:   Diagnosis Date    Concussion     Depression         Past Surgical History:   Procedure Laterality Date    HX CYST REMOVAL      On back 2016    HX SHOULDER ARTHROSCOPY Left 2008       Allergies   Allergen Reactions    Wellbutrin [Bupropion] Other (comments)     Caused agitation       Current Outpatient Medications on File Prior to Visit   Medication Sig Dispense Refill    FLUoxetine (PROzac) 40 mg capsule TAKE 1 CAPSULE BY MOUTH EVERY DAY 90 Capsule 1     No current facility-administered medications on file prior to visit. family history includes Alcohol abuse in his father; Cancer (age of onset: 35) in his brother; Diabetes in his mother.     Social History     Socioeconomic History    Marital status:      Spouse name: Not on file    Number of children: Not on file    Years of education: Not on file    Highest education level: Not on file   Occupational History    Not on file   Tobacco Use    Smoking status: Never    Smokeless tobacco: Never   Vaping Use    Vaping Use: Not on file   Substance and Sexual Activity    Alcohol use: Not Currently    Drug use: Not on file    Sexual activity: Yes     Partners: Female   Other Topics Concern    Not on file   Social History Narrative    Not on file     Social Determinants of Health     Financial Resource Strain: Low Risk     Difficulty of Paying Living Expenses: Not very hard   Food Insecurity: No Food Insecurity    Worried About Running Out of Food in the Last Year: Never true    Ran Out of Food in the Last Year: Never true   Transportation Needs: Not on file   Physical Activity: Not on file   Stress: Not on file   Social Connections: Not on file   Intimate Partner Violence: Not on file   Housing Stability: Not on file       Visit Vitals  /83   Pulse 72   Temp 97.4 °F (36.3 °C) (Temporal)   Resp 16   Ht 6' (1.829 m)   Wt 257 lb (116.6 kg)   SpO2 97%   BMI 34.86 kg/m²     General:  Well appearing male no acute distress  HEENT:   PERRL,normal conjunctiva  Neck:  Supple. Respiratory: no respiratory distress,  no wheezing, no rhonchi, no rales. No chest wall tenderness. Cardiovascular:  RRR, normal S1S2, no murmur. Musculoskeletal:  Normal gait. Skin:   Raised red pearly right arm lesion 2 mm  Similar on forehead lesion  Back upper with raised, exophytic looking mole with some irregular borders  Neuro:  A and OX4, fluent speech,  Psych:  Normal affect      Lab Results   Component Value Date/Time    WBC 10.1 11/12/2021 10:50 AM    HGB 14.9 11/12/2021 10:50 AM    HCT 46.3 11/12/2021 10:50 AM    PLATELET 363 20/44/8426 10:50 AM    MCV 91.9 11/12/2021 10:50 AM     Lab Results   Component Value Date/Time    Sodium 139 11/12/2021 10:50 AM    Potassium 4.4 11/12/2021 10:50 AM    Chloride 105 11/12/2021 10:50 AM    CO2 27 11/12/2021 10:50 AM    Anion gap 7 11/12/2021 10:50 AM    Glucose 86 11/12/2021 10:50 AM    BUN 15 11/12/2021 10:50 AM    Creatinine 0.91 11/12/2021 10:50 AM    BUN/Creatinine ratio 16 11/12/2021 10:50 AM    GFR est AA >60 11/12/2021 10:50 AM    GFR est non-AA >60 11/12/2021 10:50 AM    Calcium 9.7 11/12/2021 10:50 AM     Lab Results   Component Value Date/Time    Cholesterol, total 216 (H) 11/12/2021 10:50 AM    HDL Cholesterol 58 11/12/2021 10:50 AM    LDL, calculated 124.2 (H) 11/12/2021 10:50 AM    VLDL, calculated 33.8 11/12/2021 10:50 AM    Triglyceride 169 (H) 11/12/2021 10:50 AM    CHOL/HDL Ratio 3.7 11/12/2021 10:50 AM     Lab Results   Component Value Date/Time    TSH 1.81 11/12/2021 10:50 AM     Lab Results   Component Value Date/Time    Hemoglobin A1c 5.5 11/12/2021 10:50 AM     No results found for: Karuna Vargas, XQVID3, XQVID, VD3RIA                Assessment and Plan:   1. Suspicious skin lesions  family hx of skin cancer  - REFERRAL TO DERMATOLOGY we scheduled appointment for patient   Follow-up with dermatology scheduled for 1/18 at 11:20 AM with Dr. Fina Leary.    [address] [place] [address] [address]   Arrive 10-15 ahead of time with ID and insurance card        Caitlin Nina MD

## 2022-12-02 NOTE — PROGRESS NOTES
1. \"Have you been to the ER, urgent care clinic since your last visit? Hospitalized since your last visit? \" No    2. \"Have you seen or consulted any other health care providers outside of the [address] [place] [address] since your last visit? \" No     3. For patients aged 39-70: Has the patient had a colonoscopy / FIT/ Cologuard? NA - based on age      If the patient is female:    4. For patients aged 41-77: Has the patient had a mammogram within the past 2 years? NA - based on age or sex      11. For patients aged 21-65: Has the patient had a pap smear?  NA - based on age or sex

## 2023-04-01 DIAGNOSIS — F33.1 MODERATE EPISODE OF RECURRENT MAJOR DEPRESSIVE DISORDER (HCC): ICD-10-CM

## 2023-04-03 RX ORDER — FLUOXETINE HYDROCHLORIDE 40 MG/1
CAPSULE ORAL
Qty: 90 CAPSULE | Refills: 1 | Status: SHIPPED | OUTPATIENT
Start: 2023-04-03

## 2023-05-23 ENCOUNTER — PATIENT MESSAGE (OUTPATIENT)
Age: 38
End: 2023-05-23

## 2023-05-23 NOTE — TELEPHONE ENCOUNTER
From: Zhao Manzo  To: Dr. Stella Esparza: 5/22/2023 6:25 PM EDT  Subject: Appointment Request    Appointment Request From: Zhao Manzo    With Provider: Aubrey Garcia MD [facility]]    Preferred Date Range: Any    Preferred Times: Any Time    Reason for visit: Office Visit    Comments:  Annual check-up, physical & blood tests please

## 2023-06-23 ENCOUNTER — OFFICE VISIT (OUTPATIENT)
Age: 38
End: 2023-06-23

## 2023-06-23 VITALS
HEART RATE: 71 BPM | RESPIRATION RATE: 16 BRPM | TEMPERATURE: 98.2 F | BODY MASS INDEX: 35.53 KG/M2 | SYSTOLIC BLOOD PRESSURE: 132 MMHG | DIASTOLIC BLOOD PRESSURE: 91 MMHG | WEIGHT: 262 LBS | OXYGEN SATURATION: 100 %

## 2023-06-23 DIAGNOSIS — H61.21 EXCESSIVE CERUMEN IN RIGHT EAR CANAL: Primary | ICD-10-CM

## 2023-06-26 ASSESSMENT — ENCOUNTER SYMPTOMS
ABDOMINAL PAIN: 0
SORE THROAT: 0

## 2023-10-01 DIAGNOSIS — F33.1 MAJOR DEPRESSIVE DISORDER, RECURRENT, MODERATE (HCC): ICD-10-CM

## 2023-10-02 RX ORDER — FLUOXETINE HYDROCHLORIDE 40 MG/1
CAPSULE ORAL DAILY
Qty: 90 CAPSULE | Refills: 1 | Status: SHIPPED | OUTPATIENT
Start: 2023-10-02

## 2023-10-24 ENCOUNTER — PATIENT MESSAGE (OUTPATIENT)
Age: 38
End: 2023-10-24

## 2023-10-24 DIAGNOSIS — U07.1 COVID-19: Primary | ICD-10-CM

## 2023-10-24 NOTE — TELEPHONE ENCOUNTER
Yoel Steven 10/24/2023 9:10 AM EDT      ----- Message -----  From: Kurt Vela \"Jesus\"  Sent: 10/24/2023 8:52 AM EDT  To: *  Subject: Positive COVID Test     Good morning. I tested positive for COVID this morning after starting to feel unwell yesterday. I have received 3 moderna boosters so far. Symptoms are headache, chills, cough & periodic confusion. Should I consider requesting paxlovid or just stay isolated and hydrated?  Thanks

## 2023-11-27 ENCOUNTER — OFFICE VISIT (OUTPATIENT)
Age: 38
End: 2023-11-27
Payer: COMMERCIAL

## 2023-11-27 VITALS
BODY MASS INDEX: 36.46 KG/M2 | SYSTOLIC BLOOD PRESSURE: 101 MMHG | RESPIRATION RATE: 20 BRPM | HEART RATE: 94 BPM | DIASTOLIC BLOOD PRESSURE: 66 MMHG | WEIGHT: 269.2 LBS | OXYGEN SATURATION: 97 % | TEMPERATURE: 97 F | HEIGHT: 72 IN

## 2023-11-27 DIAGNOSIS — F33.1 MAJOR DEPRESSIVE DISORDER, RECURRENT, MODERATE (HCC): ICD-10-CM

## 2023-11-27 DIAGNOSIS — C43.9 SKIN CANCER (MELANOMA) (HCC): ICD-10-CM

## 2023-11-27 DIAGNOSIS — Z00.00 ANNUAL PHYSICAL EXAM: Primary | ICD-10-CM

## 2023-11-27 DIAGNOSIS — R74.8 LIVER ENZYME ELEVATION: ICD-10-CM

## 2023-11-27 DIAGNOSIS — E78.00 HIGH CHOLESTEROL: ICD-10-CM

## 2023-11-27 PROBLEM — F33.42 RECURRENT MAJOR DEPRESSIVE DISORDER, IN FULL REMISSION (HCC): Status: ACTIVE | Noted: 2023-11-27

## 2023-11-27 PROCEDURE — 99395 PREV VISIT EST AGE 18-39: CPT | Performed by: INTERNAL MEDICINE

## 2023-11-27 ASSESSMENT — PATIENT HEALTH QUESTIONNAIRE - PHQ9
SUM OF ALL RESPONSES TO PHQ QUESTIONS 1-9: 0
SUM OF ALL RESPONSES TO PHQ9 QUESTIONS 1 & 2: 0
SUM OF ALL RESPONSES TO PHQ QUESTIONS 1-9: 0
2. FEELING DOWN, DEPRESSED OR HOPELESS: 0
SUM OF ALL RESPONSES TO PHQ QUESTIONS 1-9: 0
SUM OF ALL RESPONSES TO PHQ QUESTIONS 1-9: 0
1. LITTLE INTEREST OR PLEASURE IN DOING THINGS: 0

## 2023-11-27 NOTE — PROGRESS NOTES
1. \"Have you been to the ER, urgent care clinic since your last visit? Hospitalized since your last visit? \" No    2. \"Have you seen or consulted any other health care providers outside of the [address] [place] [address] since your last visit? \" No     3. For patients aged 43-73: Has the patient had a colonoscopy / FIT/ Cologuard? NA - based on age      If the patient is female:    4. For patients aged 43-66: Has the patient had a mammogram within the past 2 years? NA - based on age or sex      11. For patients aged 21-65: Has the patient had a pap smear?  NA - based on age or sex

## 2023-11-27 NOTE — PROGRESS NOTES
Mr. Bulmaro Marie is presenting to follow up     CC: Annual Exam       HPI:    Mr. Bulmaro Marie   is a 42 y.o. 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 and followed by Marce Sandoval   Need records    Review of systems:  Constitutional: negative for fever, chills, weight loss, night sweats   Eyes : negative for vision changes, eye pain and discharge  Nose and Throat: negative for tinnitus, sore throat   Cardiovascular: negative for chest pain, palpitations and shortness of breath  Respiratory: negative for shortness of breath, cough and wheezing   Gastroinstestinal: negative for abdominal pain, nausea, vomiting, diarrhea, constipation, and blood in the stool  Musculoskeletal: negative for back ache and joint ache   Genitourinary: negative for dysuria, nocturia, polyuria and hematuria   Neurologic: Negative for focal weakness, numbness or incoordination  Skin:see HPI  Hematologic: negative for easy bruising      Past Medical History:   Diagnosis Date    Concussion     Depression         Past Surgical History:   Procedure Laterality Date    CYST REMOVAL      On back 2016    SHOULDER ARTHROSCOPY Left 2008       Allergies   Allergen Reactions    Bupropion Other (See Comments)     Caused agitation    Adhesive Tape Itching and Rash       Current Outpatient Medications on File Prior to Visit   Medication Sig Dispense Refill    FLUoxetine (PROZAC) 40 MG capsule TAKE 1 CAPSULE BY MOUTH EVERY DAY 90 capsule 1     No current facility-administered medications on file prior to visit. family history includes Alcohol Abuse in his father; Cancer (age of onset: 35) in his brother; Diabetes in his mother.     Social History     Socioeconomic History    Marital status:

## 2023-11-28 LAB
ALBUMIN SERPL-MCNC: 4.2 G/DL (ref 3.5–5)
ALBUMIN/GLOB SERPL: 1.2 (ref 1.1–2.2)
ALP SERPL-CCNC: 67 U/L (ref 45–117)
ALT SERPL-CCNC: 84 U/L (ref 12–78)
ANION GAP SERPL CALC-SCNC: 7 MMOL/L (ref 5–15)
AST SERPL-CCNC: 40 U/L (ref 15–37)
BASOPHILS # BLD: 0.1 K/UL (ref 0–0.1)
BASOPHILS NFR BLD: 1 % (ref 0–1)
BILIRUB SERPL-MCNC: 0.5 MG/DL (ref 0.2–1)
BUN SERPL-MCNC: 16 MG/DL (ref 6–20)
BUN/CREAT SERPL: 15 (ref 12–20)
CALCIUM SERPL-MCNC: 9.8 MG/DL (ref 8.5–10.1)
CHLORIDE SERPL-SCNC: 104 MMOL/L (ref 97–108)
CHOLEST SERPL-MCNC: 239 MG/DL
CO2 SERPL-SCNC: 25 MMOL/L (ref 21–32)
CREAT SERPL-MCNC: 1.07 MG/DL (ref 0.7–1.3)
DIFFERENTIAL METHOD BLD: NORMAL
EOSINOPHIL # BLD: 0.3 K/UL (ref 0–0.4)
EOSINOPHIL NFR BLD: 4 % (ref 0–7)
ERYTHROCYTE [DISTWIDTH] IN BLOOD BY AUTOMATED COUNT: 13.2 % (ref 11.5–14.5)
EST. AVERAGE GLUCOSE BLD GHB EST-MCNC: 117 MG/DL
GLOBULIN SER CALC-MCNC: 3.4 G/DL (ref 2–4)
GLUCOSE SERPL-MCNC: 77 MG/DL (ref 65–100)
HBA1C MFR BLD: 5.7 % (ref 4–5.6)
HCT VFR BLD AUTO: 42.2 % (ref 36.6–50.3)
HDLC SERPL-MCNC: 54 MG/DL
HDLC SERPL: 4.4 (ref 0–5)
HGB BLD-MCNC: 13.8 G/DL (ref 12.1–17)
IMM GRANULOCYTES # BLD AUTO: 0 K/UL (ref 0–0.04)
IMM GRANULOCYTES NFR BLD AUTO: 0 % (ref 0–0.5)
LDLC SERPL CALC-MCNC: 142.6 MG/DL (ref 0–100)
LYMPHOCYTES # BLD: 2.7 K/UL (ref 0.8–3.5)
LYMPHOCYTES NFR BLD: 35 % (ref 12–49)
MCH RBC QN AUTO: 29.4 PG (ref 26–34)
MCHC RBC AUTO-ENTMCNC: 32.7 G/DL (ref 30–36.5)
MCV RBC AUTO: 90 FL (ref 80–99)
MONOCYTES # BLD: 0.7 K/UL (ref 0–1)
MONOCYTES NFR BLD: 9 % (ref 5–13)
NEUTS SEG # BLD: 4 K/UL (ref 1.8–8)
NEUTS SEG NFR BLD: 51 % (ref 32–75)
NRBC # BLD: 0 K/UL (ref 0–0.01)
NRBC BLD-RTO: 0 PER 100 WBC
PLATELET # BLD AUTO: 275 K/UL (ref 150–400)
PMV BLD AUTO: 11.1 FL (ref 8.9–12.9)
POTASSIUM SERPL-SCNC: 4.1 MMOL/L (ref 3.5–5.1)
PROT SERPL-MCNC: 7.6 G/DL (ref 6.4–8.2)
RBC # BLD AUTO: 4.69 M/UL (ref 4.1–5.7)
SODIUM SERPL-SCNC: 136 MMOL/L (ref 136–145)
TRIGL SERPL-MCNC: 212 MG/DL
VLDLC SERPL CALC-MCNC: 42.4 MG/DL
WBC # BLD AUTO: 7.7 K/UL (ref 4.1–11.1)

## 2024-04-08 DIAGNOSIS — F33.1 MAJOR DEPRESSIVE DISORDER, RECURRENT, MODERATE (HCC): ICD-10-CM

## 2024-04-08 RX ORDER — FLUOXETINE HYDROCHLORIDE 40 MG/1
CAPSULE ORAL DAILY
Qty: 90 CAPSULE | Refills: 1 | Status: SHIPPED | OUTPATIENT
Start: 2024-04-08

## 2024-05-25 SDOH — ECONOMIC STABILITY: INCOME INSECURITY: HOW HARD IS IT FOR YOU TO PAY FOR THE VERY BASICS LIKE FOOD, HOUSING, MEDICAL CARE, AND HEATING?: NOT VERY HARD

## 2024-05-25 SDOH — ECONOMIC STABILITY: HOUSING INSECURITY
IN THE LAST 12 MONTHS, WAS THERE A TIME WHEN YOU DID NOT HAVE A STEADY PLACE TO SLEEP OR SLEPT IN A SHELTER (INCLUDING NOW)?: NO

## 2024-05-25 SDOH — ECONOMIC STABILITY: FOOD INSECURITY: WITHIN THE PAST 12 MONTHS, THE FOOD YOU BOUGHT JUST DIDN'T LAST AND YOU DIDN'T HAVE MONEY TO GET MORE.: NEVER TRUE

## 2024-05-25 SDOH — ECONOMIC STABILITY: FOOD INSECURITY: WITHIN THE PAST 12 MONTHS, YOU WORRIED THAT YOUR FOOD WOULD RUN OUT BEFORE YOU GOT MONEY TO BUY MORE.: NEVER TRUE

## 2024-05-25 SDOH — ECONOMIC STABILITY: TRANSPORTATION INSECURITY
IN THE PAST 12 MONTHS, HAS LACK OF TRANSPORTATION KEPT YOU FROM MEETINGS, WORK, OR FROM GETTING THINGS NEEDED FOR DAILY LIVING?: NO

## 2024-05-28 ENCOUNTER — OFFICE VISIT (OUTPATIENT)
Age: 39
End: 2024-05-28
Payer: COMMERCIAL

## 2024-05-28 VITALS
WEIGHT: 260 LBS | DIASTOLIC BLOOD PRESSURE: 86 MMHG | HEIGHT: 71 IN | BODY MASS INDEX: 36.4 KG/M2 | OXYGEN SATURATION: 100 % | RESPIRATION RATE: 20 BRPM | SYSTOLIC BLOOD PRESSURE: 126 MMHG | TEMPERATURE: 97.1 F

## 2024-05-28 DIAGNOSIS — R73.03 PRE-DIABETES: ICD-10-CM

## 2024-05-28 DIAGNOSIS — Z85.828 HISTORY OF SKIN CANCER: ICD-10-CM

## 2024-05-28 DIAGNOSIS — R74.8 LIVER ENZYME ELEVATION: ICD-10-CM

## 2024-05-28 DIAGNOSIS — F33.1 MAJOR DEPRESSIVE DISORDER, RECURRENT, MODERATE (HCC): ICD-10-CM

## 2024-05-28 DIAGNOSIS — F33.1 MAJOR DEPRESSIVE DISORDER, RECURRENT, MODERATE (HCC): Primary | ICD-10-CM

## 2024-05-28 LAB
ALBUMIN SERPL-MCNC: 4.2 G/DL (ref 3.5–5)
ALBUMIN/GLOB SERPL: 1.3 (ref 1.1–2.2)
ALP SERPL-CCNC: 72 U/L (ref 45–117)
ALT SERPL-CCNC: 51 U/L (ref 12–78)
ANION GAP SERPL CALC-SCNC: 6 MMOL/L (ref 5–15)
AST SERPL-CCNC: 24 U/L (ref 15–37)
BILIRUB SERPL-MCNC: 0.3 MG/DL (ref 0.2–1)
BUN SERPL-MCNC: 16 MG/DL (ref 6–20)
BUN/CREAT SERPL: 14 (ref 12–20)
CALCIUM SERPL-MCNC: 10.1 MG/DL (ref 8.5–10.1)
CHLORIDE SERPL-SCNC: 105 MMOL/L (ref 97–108)
CO2 SERPL-SCNC: 27 MMOL/L (ref 21–32)
CREAT SERPL-MCNC: 1.13 MG/DL (ref 0.7–1.3)
EST. AVERAGE GLUCOSE BLD GHB EST-MCNC: 111 MG/DL
GLOBULIN SER CALC-MCNC: 3.2 G/DL (ref 2–4)
GLUCOSE SERPL-MCNC: 88 MG/DL (ref 65–100)
HBA1C MFR BLD: 5.5 % (ref 4–5.6)
POTASSIUM SERPL-SCNC: 4.8 MMOL/L (ref 3.5–5.1)
PROT SERPL-MCNC: 7.4 G/DL (ref 6.4–8.2)
SODIUM SERPL-SCNC: 138 MMOL/L (ref 136–145)

## 2024-05-28 PROCEDURE — 99214 OFFICE O/P EST MOD 30 MIN: CPT | Performed by: INTERNAL MEDICINE

## 2024-05-28 ASSESSMENT — PATIENT HEALTH QUESTIONNAIRE - PHQ9
9. THOUGHTS THAT YOU WOULD BE BETTER OFF DEAD, OR OF HURTING YOURSELF: NOT AT ALL
SUM OF ALL RESPONSES TO PHQ QUESTIONS 1-9: 1
SUM OF ALL RESPONSES TO PHQ QUESTIONS 1-9: 1
1. LITTLE INTEREST OR PLEASURE IN DOING THINGS: NOT AT ALL
SUM OF ALL RESPONSES TO PHQ QUESTIONS 1-9: 1
3. TROUBLE FALLING OR STAYING ASLEEP: NOT AT ALL
2. FEELING DOWN, DEPRESSED OR HOPELESS: NOT AT ALL
5. POOR APPETITE OR OVEREATING: SEVERAL DAYS
7. TROUBLE CONCENTRATING ON THINGS, SUCH AS READING THE NEWSPAPER OR WATCHING TELEVISION: NOT AT ALL
8. MOVING OR SPEAKING SO SLOWLY THAT OTHER PEOPLE COULD HAVE NOTICED. OR THE OPPOSITE, BEING SO FIGETY OR RESTLESS THAT YOU HAVE BEEN MOVING AROUND A LOT MORE THAN USUAL: NOT AT ALL
6. FEELING BAD ABOUT YOURSELF - OR THAT YOU ARE A FAILURE OR HAVE LET YOURSELF OR YOUR FAMILY DOWN: NOT AT ALL
SUM OF ALL RESPONSES TO PHQ9 QUESTIONS 1 & 2: 0
SUM OF ALL RESPONSES TO PHQ QUESTIONS 1-9: 1
4. FEELING TIRED OR HAVING LITTLE ENERGY: NOT AT ALL
10. IF YOU CHECKED OFF ANY PROBLEMS, HOW DIFFICULT HAVE THESE PROBLEMS MADE IT FOR YOU TO DO YOUR WORK, TAKE CARE OF THINGS AT HOME, OR GET ALONG WITH OTHER PEOPLE: NOT DIFFICULT AT ALL

## 2024-05-28 NOTE — PROGRESS NOTES
\"Have you been to the ER, urgent care clinic since your last visit?  Hospitalized since your last visit?\"    NO    “Have you seen or consulted any other health care providers outside of [facility] since your last visit?”    NO          Click Here for Release of Records Request

## 2024-05-28 NOTE — PROGRESS NOTES
Mr. Goldy Hernandez is presenting to follow up     CC:  No chief complaint on file.       HPI:    Mr. Goldy Hernandez   is a 38 y.o. male with a hx of depression presenting to follow up    History of Present Illness  The patient presents for evaluation of multiple medical concerns.    The patient experienced a challenging period in 02/2024, during which he attempted to reduce his carbohydrate intake. He reported feeling unwell during this period, but subsequently managed to maintain his health. He maintains an active lifestyle, attending the gym thrice weekly, which he reports has improved his overall well-being.    Hx of melanoma  He had a couple of skin biopsies in 02/2024. He is still seeing [facility]. They were both benign and negative. He goes to see her every 6 months.           Review of systems:  Constitutional: negative for fever, chills, weight loss, night sweats   Eyes : negative for vision changes, eye pain and discharge  Nose and Throat: negative for tinnitus, sore throat   Cardiovascular: negative for chest pain, palpitations and shortness of breath  Respiratory: negative for shortness of breath, cough and wheezing   Gastroinstestinal: negative for abdominal pain, nausea, vomiting, diarrhea, constipation, and blood in the stool  Musculoskeletal: negative for back ache and joint ache   Genitourinary: negative for dysuria, nocturia, polyuria and hematuria   Neurologic: Negative for focal weakness, numbness or incoordination  Skin:see HPI  Hematologic: negative for easy bruising      Past Medical History:   Diagnosis Date    Cancer (HCC) 1/20/2023    WLE of in-situ melanoma    Concussion     Depression         Past Surgical History:   Procedure Laterality Date    CYST REMOVAL      On back 2016    SHOULDER ARTHROSCOPY Left 2008       Allergies   Allergen Reactions    Bupropion Other (See Comments)     Caused agitation    Adhesive Tape Itching and Rash       Current Outpatient Medications on File

## 2024-10-06 DIAGNOSIS — F33.1 MAJOR DEPRESSIVE DISORDER, RECURRENT, MODERATE (HCC): ICD-10-CM

## 2024-10-07 RX ORDER — FLUOXETINE 40 MG/1
CAPSULE ORAL DAILY
Qty: 90 CAPSULE | Refills: 1 | Status: SHIPPED | OUTPATIENT
Start: 2024-10-07

## 2024-10-15 ENCOUNTER — OFFICE VISIT (OUTPATIENT)
Age: 39
End: 2024-10-15

## 2024-10-15 ENCOUNTER — HOSPITAL ENCOUNTER (EMERGENCY)
Facility: HOSPITAL | Age: 39
Discharge: HOME | End: 2024-10-15
Attending: EMERGENCY MEDICINE
Payer: COMMERCIAL

## 2024-10-15 ENCOUNTER — APPOINTMENT (OUTPATIENT)
Facility: HOSPITAL | Age: 39
End: 2024-10-15
Payer: COMMERCIAL

## 2024-10-15 VITALS
WEIGHT: 234 LBS | BODY MASS INDEX: 32.64 KG/M2 | SYSTOLIC BLOOD PRESSURE: 145 MMHG | DIASTOLIC BLOOD PRESSURE: 88 MMHG | TEMPERATURE: 97.8 F | HEART RATE: 76 BPM | RESPIRATION RATE: 22 BRPM | OXYGEN SATURATION: 98 %

## 2024-10-15 VITALS
OXYGEN SATURATION: 97 % | WEIGHT: 231.7 LBS | DIASTOLIC BLOOD PRESSURE: 91 MMHG | RESPIRATION RATE: 12 BRPM | TEMPERATURE: 98.2 F | BODY MASS INDEX: 32.32 KG/M2 | HEART RATE: 71 BPM | SYSTOLIC BLOOD PRESSURE: 132 MMHG

## 2024-10-15 DIAGNOSIS — R10.11 ABDOMINAL PAIN, RIGHT UPPER QUADRANT: Primary | ICD-10-CM

## 2024-10-15 DIAGNOSIS — R10.11 RIGHT UPPER QUADRANT ABDOMINAL PAIN: Primary | ICD-10-CM

## 2024-10-15 DIAGNOSIS — R03.0 ELEVATED BLOOD PRESSURE READING: ICD-10-CM

## 2024-10-15 LAB
ALBUMIN SERPL-MCNC: 4.1 G/DL (ref 3.5–5)
ALBUMIN/GLOB SERPL: 1.2 (ref 1.1–2.2)
ALP SERPL-CCNC: 68 U/L (ref 45–117)
ALT SERPL-CCNC: 32 U/L (ref 12–78)
ANION GAP SERPL CALC-SCNC: 5 MMOL/L (ref 2–12)
AST SERPL-CCNC: 18 U/L (ref 15–37)
BASOPHILS # BLD: 0.1 K/UL (ref 0–0.1)
BASOPHILS NFR BLD: 1 % (ref 0–1)
BILIRUB SERPL-MCNC: 0.6 MG/DL (ref 0.2–1)
BILIRUBIN, URINE, POC: NEGATIVE
BLOOD URINE, POC: NEGATIVE
BUN SERPL-MCNC: 12 MG/DL (ref 6–20)
BUN/CREAT SERPL: 12 (ref 12–20)
CALCIUM SERPL-MCNC: 9.1 MG/DL (ref 8.5–10.1)
CHLORIDE SERPL-SCNC: 105 MMOL/L (ref 97–108)
CO2 SERPL-SCNC: 26 MMOL/L (ref 21–32)
CREAT SERPL-MCNC: 0.99 MG/DL (ref 0.7–1.3)
DIFFERENTIAL METHOD BLD: NORMAL
EOSINOPHIL # BLD: 0.2 K/UL (ref 0–0.4)
EOSINOPHIL NFR BLD: 3 % (ref 0–7)
ERYTHROCYTE [DISTWIDTH] IN BLOOD BY AUTOMATED COUNT: 13.2 % (ref 11.5–14.5)
GLOBULIN SER CALC-MCNC: 3.4 G/DL (ref 2–4)
GLUCOSE SERPL-MCNC: 90 MG/DL (ref 65–100)
GLUCOSE URINE, POC: NEGATIVE
HCT VFR BLD AUTO: 41.9 % (ref 36.6–50.3)
HGB BLD-MCNC: 14.2 G/DL (ref 12.1–17)
IMM GRANULOCYTES # BLD AUTO: 0 K/UL (ref 0–0.04)
IMM GRANULOCYTES NFR BLD AUTO: 0 % (ref 0–0.5)
KETONES, URINE, POC: NEGATIVE
LEUKOCYTE ESTERASE, URINE, POC: NEGATIVE
LIPASE SERPL-CCNC: 47 U/L (ref 13–75)
LYMPHOCYTES # BLD: 2.4 K/UL (ref 0.8–3.5)
LYMPHOCYTES NFR BLD: 32 % (ref 12–49)
MCH RBC QN AUTO: 29.6 PG (ref 26–34)
MCHC RBC AUTO-ENTMCNC: 33.9 G/DL (ref 30–36.5)
MCV RBC AUTO: 87.3 FL (ref 80–99)
MONOCYTES # BLD: 0.6 K/UL (ref 0–1)
MONOCYTES NFR BLD: 7 % (ref 5–13)
NEUTS SEG # BLD: 4.2 K/UL (ref 1.8–8)
NEUTS SEG NFR BLD: 57 % (ref 32–75)
NITRITE, URINE, POC: NEGATIVE
NRBC # BLD: 0 K/UL (ref 0–0.01)
NRBC BLD-RTO: 0 PER 100 WBC
PH, URINE, POC: 5.5 (ref 4.6–8)
PLATELET # BLD AUTO: 286 K/UL (ref 150–400)
PMV BLD AUTO: 10.2 FL (ref 8.9–12.9)
POTASSIUM SERPL-SCNC: 3.8 MMOL/L (ref 3.5–5.1)
PROT SERPL-MCNC: 7.5 G/DL (ref 6.4–8.2)
PROTEIN,URINE, POC: NEGATIVE
RBC # BLD AUTO: 4.8 M/UL (ref 4.1–5.7)
SODIUM SERPL-SCNC: 136 MMOL/L (ref 136–145)
SPECIFIC GRAVITY, URINE, POC: 1.01 (ref 1–1.03)
URINALYSIS CLARITY, POC: CLEAR
URINALYSIS COLOR, POC: YELLOW
UROBILINOGEN, POC: NORMAL MG/DL
WBC # BLD AUTO: 7.4 K/UL (ref 4.1–11.1)

## 2024-10-15 PROCEDURE — 36415 COLL VENOUS BLD VENIPUNCTURE: CPT

## 2024-10-15 PROCEDURE — 76705 ECHO EXAM OF ABDOMEN: CPT

## 2024-10-15 PROCEDURE — 85025 COMPLETE CBC W/AUTO DIFF WBC: CPT

## 2024-10-15 PROCEDURE — 80053 COMPREHEN METABOLIC PANEL: CPT

## 2024-10-15 PROCEDURE — 99284 EMERGENCY DEPT VISIT MOD MDM: CPT

## 2024-10-15 PROCEDURE — 83690 ASSAY OF LIPASE: CPT

## 2024-10-15 RX ORDER — DICYCLOMINE HCL 20 MG
20 TABLET ORAL 4 TIMES DAILY PRN
Qty: 20 TABLET | Refills: 0 | Status: SHIPPED | OUTPATIENT
Start: 2024-10-15 | End: 2024-10-25

## 2024-10-15 RX ORDER — MAGNESIUM CARB/ALUMINUM HYDROX 105-160MG
TABLET,CHEWABLE ORAL
Qty: 300 ML | Refills: 0 | Status: SHIPPED | OUTPATIENT
Start: 2024-10-15

## 2024-10-15 ASSESSMENT — PAIN SCALES - GENERAL: PAINLEVEL_OUTOF10: 5

## 2024-10-15 NOTE — ED PROVIDER NOTES
17.0 g/dL    Hematocrit 41.9 36.6 - 50.3 %    MCV 87.3 80.0 - 99.0 FL    MCH 29.6 26.0 - 34.0 PG    MCHC 33.9 30.0 - 36.5 g/dL    RDW 13.2 11.5 - 14.5 %    Platelets 286 150 - 400 K/uL    MPV 10.2 8.9 - 12.9 FL    Nucleated RBCs 0.0 0  WBC    nRBC 0.00 0.00 - 0.01 K/uL    Neutrophils % 57 32 - 75 %    Lymphocytes % 32 12 - 49 %    Monocytes % 7 5 - 13 %    Eosinophils % 3 0 - 7 %    Basophils % 1 0 - 1 %    Immature Granulocytes % 0 0.0 - 0.5 %    Neutrophils Absolute 4.2 1.8 - 8.0 K/UL    Lymphocytes Absolute 2.4 0.8 - 3.5 K/UL    Monocytes Absolute 0.6 0.0 - 1.0 K/UL    Eosinophils Absolute 0.2 0.0 - 0.4 K/UL    Basophils Absolute 0.1 0.0 - 0.1 K/UL    Immature Granulocytes Absolute 0.0 0.00 - 0.04 K/UL    Differential Type AUTOMATED     Comprehensive Metabolic Panel   Result Value Ref Range    Sodium 136 136 - 145 mmol/L    Potassium 3.8 3.5 - 5.1 mmol/L    Chloride 105 97 - 108 mmol/L    CO2 26 21 - 32 mmol/L    Anion Gap 5 2 - 12 mmol/L    Glucose 90 65 - 100 mg/dL    BUN 12 6 - 20 MG/DL    Creatinine 0.99 0.70 - 1.30 MG/DL    BUN/Creatinine Ratio 12 12 - 20      Est, Glom Filt Rate >90 >60 ml/min/1.73m2    Calcium 9.1 8.5 - 10.1 MG/DL    Total Bilirubin 0.6 0.2 - 1.0 MG/DL    ALT 32 12 - 78 U/L    AST 18 15 - 37 U/L    Alk Phosphatase 68 45 - 117 U/L    Total Protein 7.5 6.4 - 8.2 g/dL    Albumin 4.1 3.5 - 5.0 g/dL    Globulin 3.4 2.0 - 4.0 g/dL    Albumin/Globulin Ratio 1.2 1.1 - 2.2     Lipase   Result Value Ref Range    Lipase 47 13 - 75 U/L       RADIOLOGIC STUDIES:   Non x-ray images such as CT, Ultrasound and MRI are read by the radiologist. X-ray images are visualized and preliminarily interpreted by the ED Provider with the findings as listed in the ED Course section below.     Interpretation per the Radiologist is listed below, if available at the time of this note:    US ABDOMEN LIMITED Specify organ? GALLBLADDER, LIVER   Final Result   1.  Unremarkable right upper quadrant ultrasonography for

## 2024-10-15 NOTE — DISCHARGE INSTRUCTIONS
Results for orders placed or performed during the hospital encounter of 10/15/24   CBC with Auto Differential   Result Value Ref Range    WBC 7.4 4.1 - 11.1 K/uL    RBC 4.80 4.10 - 5.70 M/uL    Hemoglobin 14.2 12.1 - 17.0 g/dL    Hematocrit 41.9 36.6 - 50.3 %    MCV 87.3 80.0 - 99.0 FL    MCH 29.6 26.0 - 34.0 PG    MCHC 33.9 30.0 - 36.5 g/dL    RDW 13.2 11.5 - 14.5 %    Platelets 286 150 - 400 K/uL    MPV 10.2 8.9 - 12.9 FL    Nucleated RBCs 0.0 0  WBC    nRBC 0.00 0.00 - 0.01 K/uL    Neutrophils % 57 32 - 75 %    Lymphocytes % 32 12 - 49 %    Monocytes % 7 5 - 13 %    Eosinophils % 3 0 - 7 %    Basophils % 1 0 - 1 %    Immature Granulocytes % 0 0.0 - 0.5 %    Neutrophils Absolute 4.2 1.8 - 8.0 K/UL    Lymphocytes Absolute 2.4 0.8 - 3.5 K/UL    Monocytes Absolute 0.6 0.0 - 1.0 K/UL    Eosinophils Absolute 0.2 0.0 - 0.4 K/UL    Basophils Absolute 0.1 0.0 - 0.1 K/UL    Immature Granulocytes Absolute 0.0 0.00 - 0.04 K/UL    Differential Type AUTOMATED     Comprehensive Metabolic Panel   Result Value Ref Range    Sodium 136 136 - 145 mmol/L    Potassium 3.8 3.5 - 5.1 mmol/L    Chloride 105 97 - 108 mmol/L    CO2 26 21 - 32 mmol/L    Anion Gap 5 2 - 12 mmol/L    Glucose 90 65 - 100 mg/dL    BUN 12 6 - 20 MG/DL    Creatinine 0.99 0.70 - 1.30 MG/DL    BUN/Creatinine Ratio 12 12 - 20      Est, Glom Filt Rate >90 >60 ml/min/1.73m2    Calcium 9.1 8.5 - 10.1 MG/DL    Total Bilirubin 0.6 0.2 - 1.0 MG/DL    ALT 32 12 - 78 U/L    AST 18 15 - 37 U/L    Alk Phosphatase 68 45 - 117 U/L    Total Protein 7.5 6.4 - 8.2 g/dL    Albumin 4.1 3.5 - 5.0 g/dL    Globulin 3.4 2.0 - 4.0 g/dL    Albumin/Globulin Ratio 1.2 1.1 - 2.2     Lipase   Result Value Ref Range    Lipase 47 13 - 75 U/L     You were seen for right upper quadrant abdominal pain. Your workup was all normal. We considered a HIDA scan to look at the function of the gallbladder. If your pain persists, please return for this HIDA scan.

## 2024-10-15 NOTE — ED NOTES
RN assumed care of pt at this time, per triage note:    Abdominal Pain (R sided x 3 weeks with intermittent nausea and vomiting, on wegovy, seen at urgent care and referred to ER for work up

## 2024-10-15 NOTE — PROGRESS NOTES
Patient Name: Goldy Hernandez   YOB: 1985   Patient Status: Established patient,   Chief Complaint: Abdominal Pain (For the past 2 weeks pt has been having sharp pains in right side. ) and Care Coordination (Pt was referred to ER for further evaluation.)      ____________________________________________________________________________________________    External Records Reviewed: none    Limitation to History: none    Outside Historian: none    SUBJECTIVE/OBJECTIVE:  Goldy Hernandez is a 38 y.o. male presents with complaint of right upper abdominal pain. Symptoms began 2 weeks ago and are worsening since 4 days ago.  Patient describes pain as sharp, 8/10 in severity, now constant and with radiation to back. Associated symptoms include nausea without vomiting, fatigue and over the last couple of days \"just not feeling well\". Symptoms are aggravated by nothing and alleviated by nothing. The patient denies fever, diarrhea, constipation, flank pain, urinary symptoms, shortness of breath or chest pain.  Patient reports that he started Wegovy in June and has tolerated medication pretty well with 40lbs weight loss recently. No other acute symptoms reported at this time.          PAST MEDICAL HISTORY:   Medical: Pt  has a past medical history of Cancer (HCC) (1/20/2023), Concussion, and Depression.  Surgical: Pt  has a past surgical history that includes cyst removal and Shoulder arthroscopy (Left, 2008).  Family: Pt family history includes Alcohol Abuse in his father; Bleeding Prob in his paternal grandfather; Cancer in his maternal grandmother and paternal grandmother; Cancer (age of onset: 33) in his brother; Diabetes in his mother; Heart Disease in his maternal uncle; Other in his maternal grandfather.  Social: Pt   Social History     Socioeconomic History    Marital status:      Spouse name: Not on file    Number of children: Not on file    Years of education: Not on file    Highest

## 2024-10-15 NOTE — ED NOTES
Pt tolerating crackers and water well at this time. Pt reported an initial increased in pain that has now decreased to the constant irration that brought him in. ER MD at bedside

## 2024-10-15 NOTE — PATIENT INSTRUCTIONS
Go to the ER immediately for further evaluation for right upper abdominal pain.  Risks include but are not limited to severe infection, sepsis, organ perforation.

## 2024-10-15 NOTE — ED NOTES
I have reviewed the provider's instructions with the patient, answering all questions to his satisfaction. Pt ambulatory to waiting room

## 2024-10-24 ENCOUNTER — HOSPITAL ENCOUNTER (OUTPATIENT)
Facility: HOSPITAL | Age: 39
Discharge: HOME | End: 2024-10-27
Attending: INTERNAL MEDICINE
Payer: COMMERCIAL

## 2024-10-24 ENCOUNTER — OFFICE VISIT (OUTPATIENT)
Age: 39
End: 2024-10-24
Payer: COMMERCIAL

## 2024-10-24 VITALS
RESPIRATION RATE: 18 BRPM | HEIGHT: 70 IN | OXYGEN SATURATION: 97 % | DIASTOLIC BLOOD PRESSURE: 78 MMHG | HEART RATE: 76 BPM | WEIGHT: 235.2 LBS | BODY MASS INDEX: 33.67 KG/M2 | SYSTOLIC BLOOD PRESSURE: 121 MMHG | TEMPERATURE: 97.8 F

## 2024-10-24 DIAGNOSIS — R10.11 RIGHT UPPER QUADRANT ABDOMINAL PAIN: Primary | ICD-10-CM

## 2024-10-24 DIAGNOSIS — R10.11 RIGHT UPPER QUADRANT ABDOMINAL PAIN: ICD-10-CM

## 2024-10-24 DIAGNOSIS — G58.8 INTERCOSTAL NEURALGIA: ICD-10-CM

## 2024-10-24 PROCEDURE — 99213 OFFICE O/P EST LOW 20 MIN: CPT | Performed by: INTERNAL MEDICINE

## 2024-10-24 PROCEDURE — 71101 X-RAY EXAM UNILAT RIBS/CHEST: CPT

## 2024-10-24 RX ORDER — METHYLPREDNISOLONE 4 MG/1
TABLET ORAL
Qty: 1 KIT | Refills: 0 | Status: SHIPPED | OUTPATIENT
Start: 2024-10-24 | End: 2024-10-30

## 2024-10-24 RX ORDER — SEMAGLUTIDE 0.5 MG/.5ML
0.5 INJECTION, SOLUTION SUBCUTANEOUS
COMMUNITY
Start: 2024-07-07

## 2024-10-24 NOTE — PROGRESS NOTES
Goldy Hernandez (:  1985) is a 38 y.o. male, Established patient, here for evaluation of the following chief complaint(s):  Abdominal Pain (1/10 when waking up then increases through the day to 4 or 6 out of 10./Onset almost 3 weeks ago, when URQ is pressed it causes nausea)         Assessment & Plan  1. Intercostal neuralgia.  The patient's symptoms suggest a muscular origin, likely nerve pain. His lab results from 10/15/2024 were reviewed, showing normal sodium (136) and creatinine (0.99) levels. There was no elevation in white blood cell count, ruling out infection. Hemoglobin was at 14.2. The pain does not appear to be gastrointestinal in nature. He reports the pain is more severe in the evenings and worsens with movement and touch. It sometimes wraps around his body but does not cross to the other side. He has been experiencing difficulty sleeping due to the pain. A trial of Medrol Dosepak, a steroid, will be initiated to see if symptoms improve. He is advised to avoid sleeping on the affected side. If the steroid treatment proves ineffective, further imaging  may be necessary. Physical therapy may be considered if symptoms persist.    Will obtain x ray of ribs to rule out fracture       Results  Laboratory Studies  Sodium was 136, creatinine 0.99. White blood cell count 7.4, hemoglobin 14.2.    Imaging  Ultrasound showed no abnormalities.  1. Right upper quadrant abdominal pain  2. Intercostal neuralgia  -     methylPREDNISolone (MEDROL DOSEPACK) 4 MG tablet; Take by mouth., Disp-1 kit, R-0Normal    Return if symptoms worsen or fail to improve.       Subjective   History of Present Illness  The patient presents for evaluation of right upper quadrant pain.    He sought medical attention at an urgent care facility due to persistent pain in his right upper quadrant, which he had been experiencing for approximately a week and a half. Initially, he attributed the discomfort to gas. The urgent care

## 2024-10-24 NOTE — PROGRESS NOTES
\"Have you been to the ER, urgent care clinic since your last visit?  Hospitalized since your last visit?\"    Yes, ER on 10/15 for abdominal pain    “Have you seen or consulted any other health care providers outside our system since your last visit?”    NO

## 2024-12-10 ENCOUNTER — OFFICE VISIT (OUTPATIENT)
Age: 39
End: 2024-12-10

## 2024-12-10 VITALS
HEIGHT: 70 IN | SYSTOLIC BLOOD PRESSURE: 109 MMHG | BODY MASS INDEX: 33.33 KG/M2 | HEART RATE: 82 BPM | OXYGEN SATURATION: 96 % | WEIGHT: 232.8 LBS | DIASTOLIC BLOOD PRESSURE: 72 MMHG | TEMPERATURE: 98.1 F | RESPIRATION RATE: 18 BRPM

## 2024-12-10 DIAGNOSIS — Z86.39 HX OF OBESITY: Primary | ICD-10-CM

## 2024-12-10 DIAGNOSIS — F33.1 MAJOR DEPRESSIVE DISORDER, RECURRENT, MODERATE (HCC): ICD-10-CM

## 2024-12-10 DIAGNOSIS — Z85.828 HISTORY OF SKIN CANCER: ICD-10-CM

## 2024-12-10 RX ORDER — SEMAGLUTIDE 0.5 MG/.5ML
0.5 INJECTION, SOLUTION SUBCUTANEOUS
Qty: 2 ML | Refills: 3 | Status: SHIPPED | OUTPATIENT
Start: 2024-12-10

## 2024-12-10 NOTE — PROGRESS NOTES
\"Have you been to the ER, urgent care clinic since your last visit?  Hospitalized since your last visit?\"    NO    “Have you seen or consulted any other health care providers outside our system since your last visit?”    NO         
a week.    He is currently on a regimen of Prozac 20 mg, which he reports as being effective.    He received his last COVID-19 vaccine on 10/09/2024, administered by [employer], and reports no adverse reactions. He also received the influenza vaccine on the same day.    MEDICATIONS  Prozac 40mg daily     IMMUNIZATIONS  He received the [employer] COVID-19 vaccine and the influenza vaccine on October 9.    Review of Systems     10 systems reviewed and negative other then HPI  Reviewed social history, medical history, family history and allergies no changes     Current Outpatient Medications   Medication Sig Dispense Refill    WEGOVY 0.5 MG/0.5ML SOAJ SC injection Inject 0.5 mg into the skin every 7 days 2 mL 3    FLUoxetine (PROZAC) 40 MG capsule TAKE 1 CAPSULE BY MOUTH EVERY DAY 90 capsule 1     No current facility-administered medications for this visit.      Physical exam  General:  Well appearing adult no acute distress  HEENT:   PERRL,normal conjunctiva. External ear and canals normal, TMs normal.  Hearing normal to voice.  Nose without edema or discharge, normal septum.  Lips, teeth, gums normal.  Oropharynx: no erythema, no exudates, no lesions, normal tongue.  Neck:  Supple. Thyroid normal size, nontender, without nodules.  No carotid bruit. No masses or lymphadenopathy  Respiratory: no respiratory distress,  no wheezing, no rhonchi, no rales. No chest wall tenderness.  Cardiovascular:  RRR, normal S1S2, no murmur.    Gastrointestinal: normal bowel sounds, soft, nontender, without masses.  No hepatosplenomegaly.  Extremities +2 pulses, no edema, normal sensation   Musculoskeletal:  Normal gait. Normal digits and nails.  Normal strength and tone, no atrophy, and no abnormal movement.  Skin:  No rash, no lesions, no ulcers.  Skin warm, normal turgor, without induration or nodules.  Neuro:  A and OX4, fluent speech, cranial nerves normal 2-12.    Psych:  Normal affect   Objective   Blood pressure 109/72, pulse 82,

## 2025-04-04 DIAGNOSIS — F33.1 MAJOR DEPRESSIVE DISORDER, RECURRENT, MODERATE (HCC): ICD-10-CM

## 2025-04-04 DIAGNOSIS — Z86.39 HX OF OBESITY: ICD-10-CM

## 2025-04-04 RX ORDER — SEMAGLUTIDE 0.5 MG/.5ML
0.5 INJECTION, SOLUTION SUBCUTANEOUS
Qty: 2 ML | Refills: 3 | Status: SHIPPED | OUTPATIENT
Start: 2025-04-04

## 2025-04-04 RX ORDER — FLUOXETINE HYDROCHLORIDE 40 MG/1
CAPSULE ORAL DAILY
Qty: 90 CAPSULE | Refills: 1 | Status: SHIPPED | OUTPATIENT
Start: 2025-04-04

## 2025-07-22 SDOH — ECONOMIC STABILITY: INCOME INSECURITY: IN THE LAST 12 MONTHS, WAS THERE A TIME WHEN YOU WERE NOT ABLE TO PAY THE MORTGAGE OR RENT ON TIME?: NO

## 2025-07-22 SDOH — ECONOMIC STABILITY: FOOD INSECURITY: WITHIN THE PAST 12 MONTHS, YOU WORRIED THAT YOUR FOOD WOULD RUN OUT BEFORE YOU GOT MONEY TO BUY MORE.: NEVER TRUE

## 2025-07-22 SDOH — ECONOMIC STABILITY: TRANSPORTATION INSECURITY
IN THE PAST 12 MONTHS, HAS THE LACK OF TRANSPORTATION KEPT YOU FROM MEDICAL APPOINTMENTS OR FROM GETTING MEDICATIONS?: NO

## 2025-07-22 SDOH — ECONOMIC STABILITY: FOOD INSECURITY: WITHIN THE PAST 12 MONTHS, THE FOOD YOU BOUGHT JUST DIDN'T LAST AND YOU DIDN'T HAVE MONEY TO GET MORE.: NEVER TRUE

## 2025-07-25 ENCOUNTER — OFFICE VISIT (OUTPATIENT)
Age: 40
End: 2025-07-25
Payer: COMMERCIAL

## 2025-07-25 VITALS
WEIGHT: 240 LBS | HEART RATE: 74 BPM | BODY MASS INDEX: 34.36 KG/M2 | RESPIRATION RATE: 20 BRPM | SYSTOLIC BLOOD PRESSURE: 125 MMHG | OXYGEN SATURATION: 95 % | HEIGHT: 70 IN | TEMPERATURE: 97.8 F | DIASTOLIC BLOOD PRESSURE: 86 MMHG

## 2025-07-25 DIAGNOSIS — D22.5 MELANOCYTIC NEVI OF TRUNK: ICD-10-CM

## 2025-07-25 DIAGNOSIS — E78.00 HIGH CHOLESTEROL: ICD-10-CM

## 2025-07-25 DIAGNOSIS — F51.01 PRIMARY INSOMNIA: ICD-10-CM

## 2025-07-25 DIAGNOSIS — F33.1 MAJOR DEPRESSIVE DISORDER, RECURRENT, MODERATE (HCC): ICD-10-CM

## 2025-07-25 DIAGNOSIS — R73.03 PRE-DIABETES: ICD-10-CM

## 2025-07-25 DIAGNOSIS — R41.89 BRAIN FOG: ICD-10-CM

## 2025-07-25 DIAGNOSIS — Z86.39 HX OF OBESITY: Primary | ICD-10-CM

## 2025-07-25 DIAGNOSIS — R74.8 LIVER ENZYME ELEVATION: ICD-10-CM

## 2025-07-25 PROCEDURE — 99214 OFFICE O/P EST MOD 30 MIN: CPT | Performed by: INTERNAL MEDICINE

## 2025-07-25 SDOH — ECONOMIC STABILITY: FOOD INSECURITY: WITHIN THE PAST 12 MONTHS, YOU WORRIED THAT YOUR FOOD WOULD RUN OUT BEFORE YOU GOT MONEY TO BUY MORE.: NEVER TRUE

## 2025-07-25 SDOH — ECONOMIC STABILITY: FOOD INSECURITY: WITHIN THE PAST 12 MONTHS, THE FOOD YOU BOUGHT JUST DIDN'T LAST AND YOU DIDN'T HAVE MONEY TO GET MORE.: NEVER TRUE

## 2025-07-25 ASSESSMENT — PATIENT HEALTH QUESTIONNAIRE - PHQ9
5. POOR APPETITE OR OVEREATING: NOT AT ALL
3. TROUBLE FALLING OR STAYING ASLEEP: NOT AT ALL
10. IF YOU CHECKED OFF ANY PROBLEMS, HOW DIFFICULT HAVE THESE PROBLEMS MADE IT FOR YOU TO DO YOUR WORK, TAKE CARE OF THINGS AT HOME, OR GET ALONG WITH OTHER PEOPLE: NOT DIFFICULT AT ALL
2. FEELING DOWN, DEPRESSED OR HOPELESS: NOT AT ALL
9. THOUGHTS THAT YOU WOULD BE BETTER OFF DEAD, OR OF HURTING YOURSELF: NOT AT ALL
7. TROUBLE CONCENTRATING ON THINGS, SUCH AS READING THE NEWSPAPER OR WATCHING TELEVISION: NOT AT ALL
SUM OF ALL RESPONSES TO PHQ QUESTIONS 1-9: 0
1. LITTLE INTEREST OR PLEASURE IN DOING THINGS: NOT AT ALL
SUM OF ALL RESPONSES TO PHQ QUESTIONS 1-9: 0
6. FEELING BAD ABOUT YOURSELF - OR THAT YOU ARE A FAILURE OR HAVE LET YOURSELF OR YOUR FAMILY DOWN: NOT AT ALL
4. FEELING TIRED OR HAVING LITTLE ENERGY: NOT AT ALL
8. MOVING OR SPEAKING SO SLOWLY THAT OTHER PEOPLE COULD HAVE NOTICED. OR THE OPPOSITE, BEING SO FIGETY OR RESTLESS THAT YOU HAVE BEEN MOVING AROUND A LOT MORE THAN USUAL: NOT AT ALL
SUM OF ALL RESPONSES TO PHQ QUESTIONS 1-9: 0
SUM OF ALL RESPONSES TO PHQ QUESTIONS 1-9: 0

## 2025-07-25 NOTE — PROGRESS NOTES
Goldy Hernandez (:  1985) is a 39 y.o. male, Established patient, here for evaluation of the following chief complaint(s):  Obesity, Depression, and Sleep Problem (Night sweats )         Assessment & Plan  1. Obesity:  - Currently on Wegovy 0.5 mg, resumed two weeks ago after a month-long break  - Reports gaining 8 pounds during trip to [place]  - Will continue with current dosage of 0.5 mg due to severe nausea at higher doses  - Insurance will cover medication only until 2026    2. Depression:  - Currently on Prozac, reports effective management of symptoms  - Will continue with current medication regimen  - No changes in medication planned    3. Melanocytic nevi:  - Had follow-up in 2025 where another chunk was removed  - On a 6-month follow-up schedule with dermatologist  - Latest records from [facility] NP, will be requested to ensure continuity of care  - Next follow-up with dermatologist scheduled for 2025    4. Sleep disturbances:  - Likely due to stress rather than Wegovy side effects  - Sweating could be a side effect of Prozac   - Advised to maintain good sleep hygiene, limit caffeine intake to one cup of coffee per day, engage in regular exercise, and avoid stressful activities close to bedtime  - Recommended hot shower before bed and advised against using Benadryl or any sleep aid containing diphenhydramine  - Can try Unisom (doxylamine) over-the-counter, starting with half a pill due to potential grogginess, and can combine with melatonin 3 mg extended release if necessary  - If these measures prove ineffective, should inform us to consider other medications such as trazodone or Belsomra    5. Prediabetes:  - Had mild prediabetes in  with an A1c of 5.7, which has since resolved  - Hemoglobin A1c test will be conducted today to monitor condition    6. Elevated LDL cholesterol:  - LDL cholesterol levels were previously high  - Lipid panel will be conducted today to monitor

## 2025-07-25 NOTE — PATIENT INSTRUCTIONS
Need latest records from [facility]    Start with doxylamine over the counter start with half a pill ( unisom)   Melatonin 3 mg extended release   Sleep hygiene

## 2025-07-26 LAB
ALBUMIN SERPL-MCNC: 4.3 G/DL (ref 3.5–5)
ALBUMIN/GLOB SERPL: 1.4 (ref 1.1–2.2)
ALP SERPL-CCNC: 67 U/L (ref 45–117)
ALT SERPL-CCNC: 35 U/L (ref 12–78)
ANION GAP SERPL CALC-SCNC: 7 MMOL/L (ref 2–12)
AST SERPL-CCNC: 16 U/L (ref 15–37)
BILIRUB SERPL-MCNC: 0.4 MG/DL (ref 0.2–1)
BUN SERPL-MCNC: 18 MG/DL (ref 6–20)
BUN/CREAT SERPL: 17 (ref 12–20)
CALCIUM SERPL-MCNC: 9.6 MG/DL (ref 8.5–10.1)
CHLORIDE SERPL-SCNC: 105 MMOL/L (ref 97–108)
CHOLEST SERPL-MCNC: 218 MG/DL
CO2 SERPL-SCNC: 26 MMOL/L (ref 21–32)
CREAT SERPL-MCNC: 1.09 MG/DL (ref 0.7–1.3)
EST. AVERAGE GLUCOSE BLD GHB EST-MCNC: 105 MG/DL
GLOBULIN SER CALC-MCNC: 3 G/DL (ref 2–4)
GLUCOSE SERPL-MCNC: 87 MG/DL (ref 65–100)
HBA1C MFR BLD: 5.3 % (ref 4–5.6)
HDLC SERPL-MCNC: 52 MG/DL
HDLC SERPL: 4.2 (ref 0–5)
LDLC SERPL CALC-MCNC: 134.6 MG/DL (ref 0–100)
POTASSIUM SERPL-SCNC: 4.3 MMOL/L (ref 3.5–5.1)
PROT SERPL-MCNC: 7.3 G/DL (ref 6.4–8.2)
SODIUM SERPL-SCNC: 138 MMOL/L (ref 136–145)
TRIGL SERPL-MCNC: 157 MG/DL
TSH SERPL DL<=0.05 MIU/L-ACNC: 1.36 UIU/ML (ref 0.36–3.74)
VLDLC SERPL CALC-MCNC: 31.4 MG/DL

## 2025-08-05 DIAGNOSIS — Z86.39 HX OF OBESITY: ICD-10-CM

## 2025-08-05 RX ORDER — SEMAGLUTIDE 0.5 MG/.5ML
0.5 INJECTION, SOLUTION SUBCUTANEOUS
Qty: 2 ML | Refills: 3 | Status: SHIPPED | OUTPATIENT
Start: 2025-08-05